# Patient Record
Sex: FEMALE | Race: WHITE | Employment: FULL TIME | URBAN - METROPOLITAN AREA
[De-identification: names, ages, dates, MRNs, and addresses within clinical notes are randomized per-mention and may not be internally consistent; named-entity substitution may affect disease eponyms.]

---

## 2017-01-25 ENCOUNTER — OFFICE VISIT (OUTPATIENT)
Dept: SURGERY | Age: 42
End: 2017-01-25

## 2017-01-25 VITALS
DIASTOLIC BLOOD PRESSURE: 86 MMHG | HEIGHT: 66 IN | HEART RATE: 70 BPM | WEIGHT: 245 LBS | OXYGEN SATURATION: 100 % | RESPIRATION RATE: 16 BRPM | SYSTOLIC BLOOD PRESSURE: 134 MMHG | BODY MASS INDEX: 39.37 KG/M2

## 2017-01-25 DIAGNOSIS — K90.9 INTESTINAL MALABSORPTION, UNSPECIFIED TYPE: Primary | ICD-10-CM

## 2017-01-25 DIAGNOSIS — Z98.84 S/P BARIATRIC SURGERY: ICD-10-CM

## 2017-01-25 RX ORDER — MULTIVIT WITH MINERALS/HERBS
1 TABLET ORAL DAILY
COMMUNITY
End: 2022-09-15

## 2017-01-25 RX ORDER — MAGNESIUM 200 MG
1000 TABLET ORAL DAILY
COMMUNITY

## 2017-01-25 NOTE — PATIENT INSTRUCTIONS
Body Mass Index: Care Instructions  Your Care Instructions    Body mass index (BMI) can help you see if your weight is raising your risk for health problems. It uses a formula to compare how much you weigh with how tall you are. A BMI between 18.5 and 24.9 is considered healthy. A BMI between 25 and 29.9 is considered overweight. A BMI of 30 or higher is considered obese. If your BMI is in the normal range, it means that you have a lower risk for weight-related health problems. If your BMI is in the overweight or obese range, you may be at increased risk for weight-related health problems, such as high blood pressure, heart disease, stroke, arthritis or joint pain, and diabetes. BMI is just one measure of your risk for weight-related health problems. You may be at higher risk for health problems if you are not active, you eat an unhealthy diet, or you drink too much alcohol or use tobacco products. Follow-up care is a key part of your treatment and safety. Be sure to make and go to all appointments, and call your doctor if you are having problems. It's also a good idea to know your test results and keep a list of the medicines you take. How can you care for yourself at home? · Practice healthy eating habits. This includes eating plenty of fruits, vegetables, whole grains, lean protein, and low-fat dairy. · Get at least 30 minutes of exercise 5 days a week or more. Brisk walking is a good choice. You also may want to do other activities, such as running, swimming, cycling, or playing tennis or team sports. · Do not smoke. Smoking can increase your risk for health problems. If you need help quitting, talk to your doctor about stop-smoking programs and medicines. These can increase your chances of quitting for good. · Limit alcohol to 2 drinks a day for men and 1 drink a day for women. Too much alcohol can cause health problems.   If you have a BMI higher than 25  · Your doctor may do other tests to check your risk for weight-related health problems. This may include measuring the distance around your waist. A waist measurement of more than 40 inches in men or 35 inches in women can increase the risk of weight-related health problems. · Talk with your doctor about steps you can take to stay healthy or improve your health. You may need to make lifestyle changes to lose weight and stay healthy, such as changing your diet and getting regular exercise. Where can you learn more? Go to http://jenna-mary.info/. Enter S176 in the search box to learn more about \"Body Mass Index: Care Instructions. \"  Current as of: February 16, 2016  Content Version: 11.1  © 0127-7849 [employer]. Care instructions adapted under license by [employer] (which disclaims liability or warranty for this information). If you have questions about a medical condition or this instruction, always ask your healthcare professional. [place] [address] any warranty or liability for your use of this information. Patient Instructions      1. Continue to monitor carbohydrate and protein intake- remember to keep your           total  carbohydrates to 50 grams or less per day for best results. 2. Remember hydration goals - usually 48 to 64 ounces of liquids per day  3. Continue to work towards exercise goals - minimum 3 days per week of 45          minutes to  1 hour at a time. 4. Remember to take vitamins as directed        Supplement Resource Guide    Importance of Protein:   Maintains lean body mass, produces antibodies to fight off infections, heals wounds, minimizes hair loss, helps to give you energy, helps with satiety, and keeping you full between meals.     Importance of Calcium:  Needed for healthy bones and teeth, normal blood clotting, and nervous system functioning, higher risk of osteoporosis and bone disease with non-compliance. Importance of Multivitamins: Many functions. Supply you with extra nutrients that you may be missing from food. May lead to iron deficiency anemia, weakness, fatigue, and many other symptoms with non-compliance. Importance of B Vitamins:  Important for red blood cell formation, metabolism, energy, and helps to maintain a healthy nervous system. Protein Supplement  Find one you like now. Use immediately after surgery. Look for:  35-50g protein each day from your protein supplement once you reach the progression diet. 0-3 g fat per serving  0-3 g sugar per serving    Protein drinks should be split in separate dosages. Recommend: Lifelong  1 year + Calcium Supplement:     Start taking within a month after surgery. Look for: Calcium Citrate Plus D (1500 mg per day)  Recommend: Citracal     .          Avoid chocolate chewable calcium. Can use chewable bariatric or [employer] brand or similar chewable. The body cannot absorb more than 500-600 mg @ a time. Take for Life Multi-vitamin Supplement:      1st Month After Surgery: Any complete chewable, such as: [place]s Complete chewables. Avoid [place] sours or gummies. They lack iron and other important nutrients and also have added sugar. Continue with chewable vitamin or change to adult complete multivitamin one month after surgery. Menstruating women can take a prenatal vitamin. Make sure has at least 18 mg iron and 613-283 mcg folic acid):    Vitamin B12, B Complex Vitamin, and Biotin  Start taking within a month after surgery. Vitamin B12:  1000 mcg of Vitamin B12 three times weekly    Must take sublingually (meaning you take it under your tongue) or in a liquid drop form for easy absorption. B Complex Vitamin: Take a pill or liquid drop form once daily. Biotin: This vitamin can help prevent hair loss.     Recommend 5mg   (5000 mcg) a day  Biotin is Optional

## 2017-01-25 NOTE — MR AVS SNAPSHOT
Visit Information Date & Time Provider Department Dept. Phone Encounter #  
 1/25/2017  9:30 AM Zehra Larkin MD [facility] [phone] [id number] Follow-up Instructions Return in about 2 months (around 3/25/2017). Follow-up and Disposition History Your Appointments 1/25/2017 10:00 AM  
NUTRITION COUNSELING with TSS NUTR VISIT2 [facility] ([address]) Appt Note: 2 mos [address] [facility] [address]  
[phone]  
  
   
 [address] [address]  
  
    
 3/28/2017  9:00 AM  
Office Visit with Charles Byrd NP [facility] ([address])  
 [address] [facility] [address]  
  
   
 [address] [place] [address] Upcoming Health Maintenance Date Due DTaP/Tdap/Td series (1 - Tdap) 8/2/1994 PAP AKA CERVICAL CYTOLOGY 4/8/2016 INFLUENZA AGE 9 TO ADULT 8/1/2016 Allergies as of 1/25/2017  Review Complete On: 1/25/2017 By: Zehra Larkin MD  
  
 Severity Noted Reaction Type Reactions Codeine    Nausea and Vomiting, Other (comments)  
 dizziness Current Immunizations  Never Reviewed Name Date Influenza Vaccine PF 10/24/2013  3:17 PM  
  
 Not reviewed this visit You Were Diagnosed With   
  
 Codes Comments Intestinal malabsorption, unspecified type    -  Primary ICD-10-CM: K90.9 ICD-9-CM: 579.9 S/P bariatric surgery     ICD-10-CM: Z98.84 ICD-9-CM: V45.86 Vitals BP Pulse Resp Height(growth percentile) Weight(growth percentile) SpO2  
 134/86 (BP 1 Location: Left arm, BP Patient Position: Sitting) 70 16 5' 6.25\" (1.683 m) 245 lb (111.1 kg) 100% BMI OB Status Smoking Status 39.25 kg/m2 Having regular periods Never Smoker Vitals History BMI and BSA Data Body Mass Index Body Surface Area  
 39.25 kg/m 2 2.28 m 2 Preferred Pharmacy Pharmacy Name Phone [facility] [address] [place] [address] [phone] Your Updated Medication List  
  
   
This list is accurate as of: 1/25/17  9:54 AM.  Always use your most recent med list.  
  
  
  
  
 b complex vitamins tablet Take 1 Tab by mouth daily. levothyroxine 150 mcg tablet Commonly known as:  SYNTHROID Take 150 mcg by mouth Daily (before breakfast). multivitamin with iron chewable tablet Commonly known as:  Judy Harrier Take 2 Tabs by mouth daily. OMEPRAZOLE PO Take  by mouth. PROBIOTIC 4X 10-15 mg Tbec Generic drug:  B.infantis-B.ani-B.long-B.bifi Take 1 Tab by mouth daily. sertraline 50 mg tablet Commonly known as:  ZOLOFT Take 50 mg by mouth daily. ursodiol 500 mg tablet Commonly known as:  EMILY FORTE Take 1 Tab by mouth daily. Take with food. Indications: CHOLELITHIASIS PREVENTION  
  
 VITAMIN B-12 1,000 mcg sublingual tablet Generic drug:  cyanocobalamin Take 1,000 mcg by mouth daily. Follow-up Instructions Return in about 2 months (around 3/25/2017). Patient Instructions Body Mass Index: Care Instructions Your Care Instructions Body mass index (BMI) can help you see if your weight is raising your risk for health problems. It uses a formula to compare how much you weigh with how tall you are. A BMI between 18.5 and 24.9 is considered healthy. A BMI between 25 and 29.9 is considered overweight. A BMI of 30 or higher is considered obese. If your BMI is in the normal range, it means that you have a lower risk for weight-related health problems. If your BMI is in the overweight or obese range, you may be at increased risk for weight-related health problems, such as high blood pressure, heart disease, stroke, arthritis or joint pain, and diabetes. BMI is just one measure of your risk for weight-related health problems. You may be at higher risk for health problems if you are not active, you eat an unhealthy diet, or you drink too much alcohol or use tobacco products. Follow-up care is a key part of your treatment and safety. Be sure to make and go to all appointments, and call your doctor if you are having problems. It's also a good idea to know your test results and keep a list of the medicines you take. How can you care for yourself at home? · Practice healthy eating habits. This includes eating plenty of fruits, vegetables, whole grains, lean protein, and low-fat dairy. · Get at least 30 minutes of exercise 5 days a week or more. Brisk walking is a good choice. You also may want to do other activities, such as running, swimming, cycling, or playing tennis or team sports. · Do not smoke. Smoking can increase your risk for health problems. If you need help quitting, talk to your doctor about stop-smoking programs and medicines. These can increase your chances of quitting for good. · Limit alcohol to 2 drinks a day for men and 1 drink a day for women. Too much alcohol can cause health problems. If you have a BMI higher than 25 · Your doctor may do other tests to check your risk for weight-related health problems. This may include measuring the distance around your waist. A waist measurement of more than 40 inches in men or 35 inches in women can increase the risk of weight-related health problems. · Talk with your doctor about steps you can take to stay healthy or improve your health. You may need to make lifestyle changes to lose weight and stay healthy, such as changing your diet and getting regular exercise. Where can you learn more? Go to http://jenna-mary.info/. Enter S176 in the search box to learn more about \"Body Mass Index: Care Instructions. \" Current as of: February 16, 2016 Content Version: 11.1 © 6255-6937 [employer], [employer]. Care instructions adapted under license by [employer] (which disclaims liability or warranty for this information). If you have questions about a medical condition or this instruction, always ask your healthcare professional. Deisi De Los Santos any warranty or liability for your use of this information. Patient Instructions 1. Continue to monitor carbohydrate and protein intake- remember to keep your           total  carbohydrates to 50 grams or less per day for best results. 2. Remember hydration goals - usually 48 to 64 ounces of liquids per day 3. Continue to work towards exercise goals - minimum 3 days per week of 45          minutes to  1 hour at a time. 4. Remember to take vitamins as directed Supplement Resource Guide Importance of Protein:  
Maintains lean body mass, produces antibodies to fight off infections, heals wounds, minimizes hair loss, helps to give you energy, helps with satiety, and keeping you full between meals. Importance of Calcium: 
Needed for healthy bones and teeth, normal blood clotting, and nervous system functioning, higher risk of osteoporosis and bone disease with non-compliance. Importance of Multivitamins: Many functions. Supply you with extra nutrients that you may be missing from food. May lead to iron deficiency anemia, weakness, fatigue, and many other symptoms with non-compliance. Importance of B Vitamins: 
Important for red blood cell formation, metabolism, energy, and helps to maintain a healthy nervous system. Protein Supplement Find one you like now. Use immediately after surgery. Look for: 
35-50g protein each day from your protein supplement once you reach the progression diet. 0-3 g fat per serving 0-3 g sugar per serving Protein drinks should be split in separate dosages. Recommend: Lifelong 1 year + Calcium Supplement:  
 
Start taking within a month after surgery. Look for: Calcium Citrate Plus D (1500 mg per day) Recommend: Citracal 
 
 . Avoid chocolate chewable calcium. Can use chewable bariatric or [employer]NC brand or similar chewable. The body cannot absorb more than 500-600 mg @ a time. Take for Life Multi-vitamin Supplement:   
 
1st Month After Surgery: Any complete chewable, such as: [place]s Complete chewables. Avoid [place] sours or gummies. They lack iron and other important nutrients and also have added sugar. Continue with chewable vitamin or change to adult complete multivitamin one month after surgery. Menstruating women can take a prenatal vitamin. Make sure has at least 18 mg iron and 721-181 mcg folic acid): Vitamin B12, B Complex Vitamin, and Biotin Start taking within a month after surgery. Vitamin B12:  1000 mcg of Vitamin B12 three times weekly Must take sublingually (meaning you take it under your tongue) or in a liquid drop form for easy absorption. B Complex Vitamin: Take a pill or liquid drop form once daily. Biotin: This vitamin can help prevent hair loss. Recommend 5mg  
(5000 mcg) a day Biotin is Optional  
 
 
 
 
 Patient Instructions History Introducing [facility]! Tita Jiang introduces [employer] patient portal. Now you can access parts of your medical record, email your doctor's office, and request medication refills online. 1. In your internet browser, go to [email or web address] 2. Click on the First Time User? Click Here link in the Sign In box. You will see the New Member Sign Up page. 3. Enter your [employer] Access Code exactly as it appears below. You will not need to use this code after youve completed the sign-up process. If you do not sign up before the expiration date, you must request a new code. · [employer] Access Code: [email or web address] Expires: 1/30/2017  7:43 AM 
 
 4. Enter the last four digits of your Social Security Number (xxxx) and Date of Birth (mm/dd/yyyy) as indicated and click Submit. You will be taken to the next sign-up page. 5. Create a Digital Ocean ID. This will be your [employer] Ocean login ID and cannot be changed, so think of one that is secure and easy to remember. 6. Create a [employer] password. You can change your password at any time. 7. Enter your Password Reset Question and Answer. This can be used at a later time if you forget your password. 8. Enter your e-mail address. You will receive e-mail notification when new information is available in [address] 9. Click Sign Up. You can now view and download portions of your medical record. 10. Click the Download Summary menu link to download a portable copy of your medical information. If you have questions, please visit the Frequently Asked Questions section of the [employer] website. Remember, [employer] is NOT to be used for urgent needs. For medical emergencies, dial 911. Now available from your iPhone and Android! Please provide this summary of care documentation to your next provider. Your primary care clinician is listed as Sandra Cook. If you have any questions after today's visit, please call [phone].

## 2017-01-25 NOTE — PROGRESS NOTES
Subjective:     Kali Yeh  is a 35 y.o. female who presents for follow-up about 2.33 months following laparoscopic gastric bypass surgery. She has lost a total of 44 pounds since surgery. Body mass index is 39.25 kg/(m^2). .    Surgery related complication: none       She reports no issues and denies vomiting and abdominal pain. Patients pain score:0      The patient's exercise level: moderately active. Changes in her medical history and medications have been reviewed. Patient Active Problem List   Diagnosis Code    Hypothyroidism E03.9    PCOS (polycystic ovarian syndrome) E28.2    Gestational diabetes mellitus in childbirth, diet controlled O24.420    Morbid obesity with BMI of 40.0-44.9, adult ([facility] E66.01, Z68.41    Dysphagia R13.10    Anxiety F41.9    GERD (gastroesophageal reflux disease) K21.9    Dilation of esophagus K22.8    Morbid obesity ([address] E66.01    Arthritis M19.90    Morbid obesity with BMI of 45.0-49.9, adult ([facility] [address] E66.01, Z68.42    S/P bariatric surgery Z98.84    Intestinal malabsorption K90.9     Past Medical History   Diagnosis Date    Anxiety     Arthritis     gestational diabetes     Hypothyroidism     Morbid obesity ([address]     Morbid obesity with BMI of 40.0-44.9, adult ([facility]     Nausea & vomiting     PCOS (polycystic ovarian syndrome)      no medication as of 10/16    Psychiatric disorder      depression    Seasonal allergic rhinitis      Past Surgical History   Procedure Laterality Date    Hx gyn       dx laparoscopy    Hx gi  march 2010     lap band    Hx gi  07/2016     removal of gastric band    Hx knee arthroscopy Left      x 3     Current Outpatient Prescriptions   Medication Sig Dispense Refill    cyanocobalamin (VITAMIN B-12) 1,000 mcg sublingual tablet Take 1,000 mcg by mouth daily.  b complex vitamins tablet Take 1 Tab by mouth daily.  ursodiol (EMILY FORTE) 500 mg tablet Take 1 Tab by mouth daily. Take with food. Indications: CHOLELITHIASIS PREVENTION 30 Tab 4    OMEPRAZOLE PO Take  by mouth.  levothyroxine (SYNTHROID) 150 mcg tablet Take 150 mcg by mouth Daily (before breakfast).  sertraline (ZOLOFT) 50 mg tablet Take 50 mg by mouth daily.  multivitamin with iron (FLINTSTONES) chewable tablet Take 2 Tabs by mouth daily.  B.infantis-B.ani-B.long-B.bifi (PROBIOTIC 4X) 10-15 mg TbEC Take 1 Tab by mouth daily. Objective:     Visit Vitals    /86 (BP 1 Location: Left arm, BP Patient Position: Sitting)    Pulse 70    Resp 16    Ht 5' 6.25\" (1.683 m)    Wt 111.1 kg (245 lb)    SpO2 100%    BMI 39.25 kg/m2        Physical Exam:    General:  alert, cooperative, no distress, appears stated age   Lungs:   clear to auscultation bilaterally   Heart:  Regular rate and rhythm   Abdomen:   abdomen is soft without significant tenderness, masses, organomegaly or guarding; Incisions: healing well, no hernias         Assessment:     1. History of Morbid obesity, status post  laparoscopic gastric bypass surgery. Doing well; no concerns. .     Plan:     1. Remember to measure portions, continue low carbohydrate diet  2. Tolerated transition to solids without issue  3. Remember vitamin supplements. 4. Exercise regimen appears adequate. 5. Attend support group  6. Follow-up in 2 month(s). 7. Total time spent with the patient 20 minutes.

## 2017-03-28 ENCOUNTER — OFFICE VISIT (OUTPATIENT)
Dept: SURGERY | Age: 42
End: 2017-03-28

## 2017-03-28 VITALS
SYSTOLIC BLOOD PRESSURE: 124 MMHG | OXYGEN SATURATION: 99 % | HEART RATE: 87 BPM | DIASTOLIC BLOOD PRESSURE: 77 MMHG | BODY MASS INDEX: 36.16 KG/M2 | HEIGHT: 66 IN | WEIGHT: 225 LBS

## 2017-03-28 DIAGNOSIS — E55.9 HYPOVITAMINOSIS D: ICD-10-CM

## 2017-03-28 DIAGNOSIS — Z98.84 S/P BARIATRIC SURGERY: ICD-10-CM

## 2017-03-28 DIAGNOSIS — K90.9 INTESTINAL MALABSORPTION, UNSPECIFIED TYPE: Primary | ICD-10-CM

## 2017-03-28 RX ORDER — LANOLIN ALCOHOL/MO/W.PET/CERES
1 CREAM (GRAM) TOPICAL
COMMUNITY
End: 2019-06-28 | Stop reason: ALTCHOICE

## 2017-03-28 RX ORDER — GLUCOSAMINE/CHONDR SU A SOD 750-600 MG
TABLET ORAL
COMMUNITY
End: 2018-12-26 | Stop reason: ALTCHOICE

## 2017-03-28 NOTE — PATIENT INSTRUCTIONS
Continue to monitor carbohydrate and protein intake. Eat regularly. Remember to stay hydrated  Continue to take vitamins  Continue to work towards exercise goals  Follow-up with PCP - especially to check thyroid. Restart fransico forte. Take until 6 months post-op. Return in 2 months. Get labs. Call office with any future questions or concerns             Walking for Exercise: Care Instructions  Your Care Instructions  Walking is one of the easiest ways to get the exercise you need for good health. A brisk, 30-minute walk each day can help you feel better and have more energy. It can help you lower your risk of disease. Walking can help you keep your bones strong and your heart healthy. Check with your doctor before you start a walking plan if you have heart problems, other health issues, or you have not been active in a long time. Follow your doctor's instructions for safe levels of exercise. Follow-up care is a key part of your treatment and safety. Be sure to make and go to all appointments, and call your doctor if you are having problems. It's also a good idea to know your test results and keep a list of the medicines you take. How can you care for yourself at home? Getting started  · Start slowly and set a short-term goal. For example, walk for 5 or 10 minutes every day. · Bit by bit, increase the amount you walk every day. Try for at least 30 minutes on most days of the week. You also may want to swim, bike, or do other activities. · If finding enough time is a problem, it is fine to be active in blocks of 10 minutes or more throughout your day and week. · To get the heart-healthy benefits of walking, you need to walk briskly enough to increase your heart rate and breathing, but not so fast that you cannot talk comfortably. · Wear comfortable shoes that fit well and provide good support for your feet and ankles.   Staying with your plan  · After you've made walking a habit, set a longer-term goal. You may want to set a goal of walking briskly for longer or walking farther. Experts say to do 2½ hours of moderate activity a week. A faster heartbeat is what defines moderate-level activity. · To stay motivated, walk with friends, coworkers, or pets. · Use a phone celina or pedometer to track your steps each day. Set a goal to increase your steps. Once you get there, set a higher goal. Adults should work toward 10,000 steps a day. Children who are 10to 15years old need more steps--at least 12,000 for girls and at least 15,000 for boys. · If the weather keeps you from walking outside, go for walks at the mall with a friend. Local schools and churches may have indoor gyms where you can walk. Fitting a walk into your workday  · Park several blocks away from work, or get off the bus a few stops early. · Use the stairs instead of the elevator, at least for a few floors. · Suggest holding meetings with colleagues during a walk inside or outside the building. · Use the restroom that is the farthest from your desk or workstation. · Use your morning and afternoon breaks to take quick 15-minute walks. Staying safe  · Know your surroundings. Walk in a well-lighted, safe place. If it is dark, walk with a partner. Wear light-colored clothing. If you can, buy a vest or jacket that reflects light. · Carry a cell phone for emergencies. · Drink plenty of water. Take a water bottle with you when you walk. This is very important if it is hot out. · Be careful not to slip on wet or icy ground. You can buy \"grippers\" for your shoes to help keep you from slipping. · Pay attention to your walking surface. Use sidewalks and paths. · If you have breathing problems like asthma or COPD, ask your doctor when it is safe for you to walk outdoors. Cold, dry air, smog, pollen, or other things in the air could cause breathing problems. Where can you learn more? Go to http://jenna-mary.info/.   Enter R159 in the search box to learn more about \"Walking for Exercise: Care Instructions. \"  Current as of: May 27, 2016  Content Version: 11.1  © 0403-3689 [employer]. Care instructions adapted under license by [employer] (which disclaims liability or warranty for this information). If you have questions about a medical condition or this instruction, always ask your healthcare professional. [address] any warranty or liability for your use of this information.

## 2017-03-29 NOTE — PROGRESS NOTES
Subjective:   Elliot Balderas  is a 35 y.o. female who presents for follow-up about 4 months following laparoscopic gastric bypass surgery. She is a former lap band pt of Dr. Riri Saeed who had her band removed 7/15/16. Surgery related complication: NA. She has lost a total of 64 pounds since surgery. Body mass index is 36.04 kg/(m^2). Loss of EBW is 42%. The patient presents today to assess their progress toward their weight loss goal & to address any issues that may be present:  She is tolerating solids without difficulty and denies vomiting and abdominal pain. Asymptomatic off omeprazole. Fluid intake:  good                                                Protein intake: good - food + protein shakes  Meals/day: 4+  Taking vitamins as recommended. The patient's exercise level: very active. Walking, swimming. Changes in her medical history and medications have been reviewed. Took fransico forte for 1 month, but did not realize that she had to continue taking it.     Patient Active Problem List   Diagnosis Code    Hypothyroidism E03.9    PCOS (polycystic ovarian syndrome) E28.2    Gestational diabetes mellitus in childbirth, diet controlled O24.420    Morbid obesity with BMI of 40.0-44.9, adult ([facility] [address] E66.01, Z68.41    Dysphagia R13.10    Anxiety F41.9    GERD (gastroesophageal reflux disease) K21.9    Dilation of esophagus K22.8    Morbid obesity ([address] E66.01    Arthritis M19.90    Morbid obesity with BMI of 45.0-49.9, adult ([facility] [address] E66.01, Z68.42    S/P bariatric surgery Z98.84    Intestinal malabsorption K90.9     Past Medical History:   Diagnosis Date    Anxiety     Arthritis     gestational diabetes     Hypothyroidism     Morbid obesity ([facility] [address]     Morbid obesity with BMI of 40.0-44.9, adult ([facility])     Nausea & vomiting     PCOS (polycystic ovarian syndrome)     no medication as of 10/16    Psychiatric disorder     depression    Seasonal allergic rhinitis      Past Surgical History:   Procedure Laterality Date    HX GI  march 2010    lap band    HX GI  07/2016    removal of gastric band    HX GYN      dx laparoscopy    HX KNEE ARTHROSCOPY Left     x 3     Current Outpatient Prescriptions   Medication Sig Dispense Refill    Biotin 2,500 mcg cap Take  by mouth.  calcium citrate-vitamin d3 (CITRACAL+D) 315-200 mg-unit tab Take 1 Tab by mouth daily (with breakfast).  cyanocobalamin (VITAMIN B-12) 1,000 mcg sublingual tablet Take 1,000 mcg by mouth daily.  b complex vitamins tablet Take 1 Tab by mouth daily.  levothyroxine (SYNTHROID) 150 mcg tablet Take 150 mcg by mouth Daily (before breakfast).  sertraline (ZOLOFT) 50 mg tablet Take 50 mg by mouth daily.  multivitamin with iron (FLINTSTONES) chewable tablet Take 2 Tabs by mouth daily.  ursodiol (EMILY FORTE) 500 mg tablet Take 1 Tab by mouth daily. Take with food. Indications: CHOLELITHIASIS PREVENTION 30 Tab 4    B.infantis-B.ani-B.long-B.bifi (PROBIOTIC 4X) 10-15 mg TbEC Take 1 Tab by mouth daily. Review of Systems:  General - Denies fatigue, fever or chills  Cardiac - Denies chest pain, palpitations or shortness of breath  Pulmonary - Denies shortness of breath or productive cough  Gastrointestinal - as noted above  Musculoskeletal - Denies joint or muscular weakness, pain, stiffness  Hematologic - Denies abnormal bleeding or bruising  Neurologic - Denies weakness, paralysis, numbness, tingling    Objective:     Visit Vitals    /77 (BP 1 Location: Left arm, BP Patient Position: Sitting)    Pulse 87    Ht 5' 6.25\" (1.683 m)    Wt 102.1 kg (225 lb)    SpO2 99%    BMI 36.04 kg/m2        Physical Exam:    General:  alert, cooperative, no distress, appears stated age   Lungs:   clear to auscultation bilaterally   Heart:  Regular rate and rhythm   Abdomen:   abdomen is soft without tenderness, masses, organomegaly or guarding; Active bowel sounds all 4 quadrants.    Incisions: healed         Assessment:     1. History of Morbid obesity, status post  laparoscopic gastric bypass surgery. Doing well; no concerns. 2.  GERD -asymptomatic off omeprazole     Plan:       1. Today the patient & I have reviewed their weight loss and their diet - including how appropriate their weight loss and food choices are.   2. To continue focus on hydration. 3. Reminded to measure portions, continue high protein, low carbohydrate diet. Reminded to eat regularly, to eat slowly & not to drink with meals. Pt verbalized understanding of diet. Reminded of availability of dietician support. 4.  Reminded of importance of vitamin supplements. 5. To continue to increase activity. 6. Encouraged to attend support group. 7. To continue current rx. To restart fransico forte & take until 6m post-op. To continue follow-up with PCP. 8. Reminded of labs needed @ 6m visit. Lab slip given to pt. 9. I have discussed this plan and education material with patient. Pt verbalized understanding. 10. Follow-up in 2 month(s). To call if questions/concerns prior to office visit. 11. Total time spent with pt - 25 minutes    Ms. Phuong Byrd has a reminder for a \"due or due soon\" health maintenance. I have asked that she contact her primary care provider for follow-up on this health maintenance.

## 2017-05-31 ENCOUNTER — HOSPITAL ENCOUNTER (OUTPATIENT)
Dept: LAB | Age: 42
Discharge: HOME | End: 2017-05-31
Payer: COMMERCIAL

## 2017-05-31 ENCOUNTER — OFFICE VISIT (OUTPATIENT)
Dept: SURGERY | Age: 42
End: 2017-05-31

## 2017-05-31 VITALS
HEART RATE: 71 BPM | DIASTOLIC BLOOD PRESSURE: 69 MMHG | OXYGEN SATURATION: 100 % | RESPIRATION RATE: 16 BRPM | HEIGHT: 66 IN | SYSTOLIC BLOOD PRESSURE: 96 MMHG | WEIGHT: 212 LBS | BODY MASS INDEX: 34.07 KG/M2

## 2017-05-31 DIAGNOSIS — E03.9 ACQUIRED HYPOTHYROIDISM: Primary | ICD-10-CM

## 2017-05-31 LAB
25(OH)D3 SERPL-MCNC: 28.3 NG/ML (ref 30–100)
ALBUMIN SERPL BCP-MCNC: 3.7 G/DL (ref 3.4–5)
ALBUMIN/GLOB SERPL: 0.9 {RATIO} (ref 0.8–1.7)
ALP SERPL-CCNC: 74 U/L (ref 45–117)
ALT SERPL-CCNC: 18 U/L (ref 13–56)
ANION GAP BLD CALC-SCNC: 7 MMOL/L (ref 3–18)
AST SERPL W P-5'-P-CCNC: 16 U/L (ref 15–37)
BASOPHILS # BLD AUTO: 0 K/UL (ref 0–0.06)
BASOPHILS # BLD: 0 % (ref 0–2)
BILIRUB SERPL-MCNC: 0.4 MG/DL (ref 0.2–1)
BUN SERPL-MCNC: 16 MG/DL (ref 7–18)
BUN/CREAT SERPL: 19 (ref 12–20)
CALCIUM SERPL-MCNC: 9.1 MG/DL (ref 8.5–10.1)
CHLORIDE SERPL-SCNC: 105 MMOL/L (ref 100–108)
CO2 SERPL-SCNC: 29 MMOL/L (ref 21–32)
CREAT SERPL-MCNC: 0.86 MG/DL (ref 0.6–1.3)
DIFFERENTIAL METHOD BLD: ABNORMAL
EOSINOPHIL # BLD: 0.1 K/UL (ref 0–0.4)
EOSINOPHIL NFR BLD: 2 % (ref 0–5)
ERYTHROCYTE [DISTWIDTH] IN BLOOD BY AUTOMATED COUNT: 14.8 % (ref 11.6–14.5)
FERRITIN SERPL-MCNC: 13 NG/ML (ref 8–388)
FOLATE SERPL-MCNC: >20 NG/ML (ref 3.1–17.5)
GLOBULIN SER CALC-MCNC: 3.9 G/DL (ref 2–4)
GLUCOSE SERPL-MCNC: 88 MG/DL (ref 74–99)
HCT VFR BLD AUTO: 41.6 % (ref 35–45)
HGB BLD-MCNC: 13.7 G/DL (ref 12–16)
IRON SERPL-MCNC: 68 UG/DL (ref 50–175)
LYMPHOCYTES # BLD AUTO: 33 % (ref 21–52)
LYMPHOCYTES # BLD: 2.6 K/UL (ref 0.9–3.6)
MCH RBC QN AUTO: 26.9 PG (ref 24–34)
MCHC RBC AUTO-ENTMCNC: 32.9 G/DL (ref 31–37)
MCV RBC AUTO: 81.7 FL (ref 74–97)
MONOCYTES # BLD: 0.4 K/UL (ref 0.05–1.2)
MONOCYTES NFR BLD AUTO: 5 % (ref 3–10)
NEUTS SEG # BLD: 4.9 K/UL (ref 1.8–8)
NEUTS SEG NFR BLD AUTO: 60 % (ref 40–73)
PLATELET # BLD AUTO: 331 K/UL (ref 135–420)
PMV BLD AUTO: 11.2 FL (ref 9.2–11.8)
POTASSIUM SERPL-SCNC: 4.7 MMOL/L (ref 3.5–5.5)
PROT SERPL-MCNC: 7.6 G/DL (ref 6.4–8.2)
RBC # BLD AUTO: 5.09 M/UL (ref 4.2–5.3)
SODIUM SERPL-SCNC: 141 MMOL/L (ref 136–145)
TSH SERPL DL<=0.05 MIU/L-ACNC: 2.97 UIU/ML (ref 0.36–3.74)
VIT B12 SERPL-MCNC: 1384 PG/ML (ref 211–911)
WBC # BLD AUTO: 8 K/UL (ref 4.6–13.2)

## 2017-05-31 PROCEDURE — 83540 ASSAY OF IRON: CPT | Performed by: SPECIALIST

## 2017-05-31 PROCEDURE — 82728 ASSAY OF FERRITIN: CPT | Performed by: SPECIALIST

## 2017-05-31 PROCEDURE — 80053 COMPREHEN METABOLIC PANEL: CPT | Performed by: SPECIALIST

## 2017-05-31 PROCEDURE — 84425 ASSAY OF VITAMIN B-1: CPT | Performed by: SPECIALIST

## 2017-05-31 PROCEDURE — 82306 VITAMIN D 25 HYDROXY: CPT | Performed by: SPECIALIST

## 2017-05-31 PROCEDURE — 82607 VITAMIN B-12: CPT | Performed by: SPECIALIST

## 2017-05-31 PROCEDURE — 36415 COLL VENOUS BLD VENIPUNCTURE: CPT | Performed by: SPECIALIST

## 2017-05-31 PROCEDURE — 84443 ASSAY THYROID STIM HORMONE: CPT | Performed by: SPECIALIST

## 2017-05-31 PROCEDURE — 85025 COMPLETE CBC W/AUTO DIFF WBC: CPT | Performed by: SPECIALIST

## 2017-05-31 NOTE — PATIENT INSTRUCTIONS
Body Mass Index: Care Instructions  Your Care Instructions    Body mass index (BMI) can help you see if your weight is raising your risk for health problems. It uses a formula to compare how much you weigh with how tall you are. · A BMI lower than 18.5 is considered underweight. · A BMI between 18.5 and 24.9 is considered healthy. · A BMI between 25 and 29.9 is considered overweight. A BMI of 30 or higher is considered obese. If your BMI is in the normal range, it means that you have a lower risk for weight-related health problems. If your BMI is in the overweight or obese range, you may be at increased risk for weight-related health problems, such as high blood pressure, heart disease, stroke, arthritis or joint pain, and diabetes. If your BMI is in the underweight range, you may be at increased risk for health problems such as fatigue, lower protection (immunity) against illness, muscle loss, bone loss, hair loss, and hormone problems. BMI is just one measure of your risk for weight-related health problems. You may be at higher risk for health problems if you are not active, you eat an unhealthy diet, or you drink too much alcohol or use tobacco products. Follow-up care is a key part of your treatment and safety. Be sure to make and go to all appointments, and call your doctor if you are having problems. It's also a good idea to know your test results and keep a list of the medicines you take. How can you care for yourself at home? · Practice healthy eating habits. This includes eating plenty of fruits, vegetables, whole grains, lean protein, and low-fat dairy. · If your doctor recommends it, get more exercise. Walking is a good choice. Bit by bit, increase the amount you walk every day. Try for at least 30 minutes on most days of the week. · Do not smoke. Smoking can increase your risk for health problems. If you need help quitting, talk to your doctor about stop-smoking programs and medicines. These can increase your chances of quitting for good. · Limit alcohol to 2 drinks a day for men and 1 drink a day for women. Too much alcohol can cause health problems. If you have a BMI higher than 25  · Your doctor may do other tests to check your risk for weight-related health problems. This may include measuring the distance around your waist. A waist measurement of more than 40 inches in men or 35 inches in women can increase the risk of weight-related health problems. · Talk with your doctor about steps you can take to stay healthy or improve your health. You may need to make lifestyle changes to lose weight and stay healthy, such as changing your diet and getting regular exercise. If you have a BMI lower than 18.5  · Your doctor may do other tests to check your risk for health problems. · Talk with your doctor about steps you can take to stay healthy or improve your health. You may need to make lifestyle changes to gain or maintain weight and stay healthy, such as getting more healthy foods in your diet and doing exercises to build muscle. Where can you learn more? Go to http://jenna-mary.info/. Enter S176 in the search box to learn more about \"Body Mass Index: Care Instructions. \"  Current as of: January 23, 2017  Content Version: 11.2  © 5032-0318 [employer]. Care instructions adapted under license by [employer] (which disclaims liability or warranty for this information). If you have questions about a medical condition or this instruction, always ask your healthcare professional. [place] [address] any warranty or liability for your use of this information. Patient Instructions      1. Continue to monitor carbohydrate and protein intake- remember to keep your           total  carbohydrates to 50 grams or less per day for best results.   2. Remember hydration goals - usually 48 to 64 ounces of liquids per day  3. Continue to work towards exercise goals - minimum 3 days per week of 45          minutes to  1 hour at a time. 4. Remember to take vitamins as directed        Supplement Resource Guide    Importance of Protein:   Maintains lean body mass, produces antibodies to fight off infections, heals wounds, minimizes hair loss, helps to give you energy, helps with satiety, and keeping you full between meals. Importance of Calcium:  Needed for healthy bones and teeth, normal blood clotting, and nervous system functioning, higher risk of osteoporosis and bone disease with non-compliance. Importance of Multivitamins: Many functions. Supply you with extra nutrients that you may be missing from food. May lead to iron deficiency anemia, weakness, fatigue, and many other symptoms with non-compliance. Importance of B Vitamins:  Important for red blood cell formation, metabolism, energy, and helps to maintain a healthy nervous system. Protein Supplement  Find one you like now. Use immediately after surgery. Look for:  35-50g protein each day from your protein supplement once you reach the progression diet. 0-3 g fat per serving  0-3 g sugar per serving    Protein drinks should be split in separate dosages. Recommend: Lifelong  1 year + Calcium Supplement:     Start taking within a month after surgery. Look for: Calcium Citrate Plus D (1500 mg per day)  Recommend: Citracal     .          Avoid chocolate chewable calcium. Can use chewable bariatric or [employer] brand or similar chewable. The body cannot absorb more than 500-600 mg @ a time. Take for Life Multi-vitamin Supplement:      1st Month After Surgery: Any complete chewable, such as: Dyers Complete chewables. Avoid Dyer sours or gummies. They lack iron and other important nutrients and also have added sugar.     Continue with chewable vitamin or change to adult complete multivitamin one month after surgery. Menstruating women can take a prenatal vitamin. Make sure has at least 18 mg iron and 162-911 mcg folic acid):    Vitamin B12, B Complex Vitamin, and Biotin  Start taking within a month after surgery. Vitamin B12:  1000 mcg of Vitamin B12 three times weekly    Must take sublingually (meaning you take it under your tongue) or in a liquid drop form for easy absorption. B Complex Vitamin: Take a pill or liquid drop form once daily. Biotin: This vitamin can help prevent hair loss.     Recommend 5mg   (5000 mcg) a day  Biotin is Optional

## 2017-05-31 NOTE — PROGRESS NOTES
Subjective:     Erinn Anders  is a 35 y.o. female who presents for follow-up about 6 months following laparoscopic gastric bypass surgery. She has lost a total of 77 pounds since surgery. Body mass index is 33.96 kg/(m^2). . EBWL is (50%). The patient presents today to assess their progress toward their goal of weight loss and to address any issues that may be present. Today the patient and I have reviewed their diet and how appropriate their food choices are. The following issues have been identified : no new issues. .  Surgery related complication: none       She reports no issues and denies vomiting and abdominal pain. The patients diet choices have been reviewed today and are as follows:      Breakfast: protein shake      Lunch: chicken salad or leftovers      Snack: protein chips, shake      Supper :cooking    Patients pain score:0      The patient's exercise level: exercising 4 times week    Changes in her medical history and medications have been reviewed.     Patient Active Problem List   Diagnosis Code    Hypothyroidism E03.9    PCOS (polycystic ovarian syndrome) E28.2    Gestational diabetes mellitus in childbirth, diet controlled O24.420    Morbid obesity with BMI of 40.0-44.9, adult ([facility] [address] E66.01, Z68.41    Dysphagia R13.10    Anxiety F41.9    GERD (gastroesophageal reflux disease) K21.9    Dilation of esophagus K22.8    Morbid obesity ([address] E66.01    Arthritis M19.90    Morbid obesity with BMI of 45.0-49.9, adult ([address] E66.01, Z68.42    S/P bariatric surgery Z98.84    Intestinal malabsorption K90.9     Past Medical History:   Diagnosis Date    Anxiety     Arthritis     gestational diabetes     Hypothyroidism     Morbid obesity ([address]     Morbid obesity with BMI of 40.0-44.9, adult ([facility])     Nausea & vomiting     PCOS (polycystic ovarian syndrome)     no medication as of 10/16    Psychiatric disorder     depression    Seasonal allergic rhinitis      Past Surgical History:   Procedure Laterality Date    HX GI  march 2010    lap band    HX GI  07/2016    removal of gastric band    HX GYN      dx laparoscopy    HX KNEE ARTHROSCOPY Left     x 3     Current Outpatient Prescriptions   Medication Sig Dispense Refill    Biotin 2,500 mcg cap Take  by mouth.  calcium citrate-vitamin d3 (CITRACAL+D) 315-200 mg-unit tab Take 1 Tab by mouth daily (with breakfast).  cyanocobalamin (VITAMIN B-12) 1,000 mcg sublingual tablet Take 1,000 mcg by mouth daily.  b complex vitamins tablet Take 1 Tab by mouth daily.  levothyroxine (SYNTHROID) 150 mcg tablet Take 150 mcg by mouth Daily (before breakfast).  sertraline (ZOLOFT) 50 mg tablet Take 50 mg by mouth daily.  multivitamin with iron (FLINTSTONES) chewable tablet Take 2 Tabs by mouth daily.  ursodiol (EMILY FORTE) 500 mg tablet Take 1 Tab by mouth daily. Take with food. Indications: CHOLELITHIASIS PREVENTION 30 Tab 4    B.infantis-B.ani-B.long-B.bifi (PROBIOTIC 4X) 10-15 mg TbEC Take 1 Tab by mouth daily.           Review of Symptoms:       General - No history or complaints of unexpected fever or chills  Head/Neck - No history or complaints of headache or dizziness  Cardiac - No history or complaints of chest pain, palpitations, or shortness of breath  Pulmonary - No history or complaints of shortness of breath or productive cough  Gastrointestinal - as noted above  Genitourinary - No history or complaints of hematuria/dysuria or renal lithiasis  Musculoskeletal - No history or complaints of joint  muscular weakness  Hematologic - No history of any bleeding episodes  Neurologic - No history or complaints of  migraine headaches or neurologic symptoms                     Objective:     Visit Vitals    BP 96/69 (BP 1 Location: Left arm, BP Patient Position: Sitting)    Pulse 71    Resp 16    Ht 5' 6.25\" (1.683 m)    Wt 96.2 kg (212 lb)    SpO2 100%    BMI 33.96 kg/m2        Physical Exam:    General: alert, cooperative, no distress, appears stated age   Lungs:   clear to auscultation bilaterally   Heart:  Regular rate and rhythm   Abdomen:   abdomen is soft without significant tenderness, masses, organomegaly or guarding; Incisions: healing well, no hernias         Lab Results   Component Value Date/Time    WBC 9.2 11/09/2016 10:45 AM    HGB 14.1 11/09/2016 10:45 AM    HCT 43.8 11/09/2016 10:45 AM    PLATELET 919 41/02/3202 10:45 AM    MCV 84.6 11/09/2016 10:45 AM    Hgb, External 14.1 03/06/2013    Hct, External 36.5 03/06/2013    Platelet cnt., External 260K 03/06/2013     Lab Results   Component Value Date/Time    Sodium 144 11/09/2016 10:45 AM    Potassium 4.8 11/09/2016 10:45 AM    Chloride 105 11/09/2016 10:45 AM    CO2 33 11/09/2016 10:45 AM    Anion gap 6 11/09/2016 10:45 AM    Glucose 79 11/09/2016 10:45 AM    BUN 11 11/09/2016 10:45 AM    Creatinine 0.91 11/09/2016 10:45 AM    BUN/Creatinine ratio 12 11/09/2016 10:45 AM    GFR est AA >60 11/09/2016 10:45 AM    GFR est non-AA >60 11/09/2016 10:45 AM    Calcium 8.6 11/09/2016 10:45 AM    Bilirubin, total 0.3 11/09/2016 10:45 AM    AST (SGOT) 19 11/09/2016 10:45 AM    Alk. phosphatase 71 11/09/2016 10:45 AM    Protein, total 7.6 11/09/2016 10:45 AM    Albumin 3.7 11/09/2016 10:45 AM    Globulin 3.9 11/09/2016 10:45 AM    A-G Ratio 0.9 11/09/2016 10:45 AM    ALT (SGPT) 28 11/09/2016 10:45 AM     Lab Results   Component Value Date/Time    Iron 53 01/25/2010 09:39 AM     No results found for: FOL, RBCF  No results found for: VITD3, Allison Sicard, XQVID, VD3RIA          Assessment:     1. History of Morbid obesity, status post  laparoscopic gastric bypass surgery. Doing well; no concerns. .     Plan:     1. Remember to measure portions, continue low carbohydrate diet  2. Continue to concentrate on protein intake meeting daily requirements  3. Remember vitamin supplements.  The importance of such was discussed regarding the malabsorptive issues that the surgery creates. 4. Exercise regimen appears to be: fairly good  5. Try and attend support group if feasible. 6. Follow-up in 3 month(s). 7. Lab to be drawn today. 8. Total time spent with the patient 30 minutes.

## 2017-06-02 ENCOUNTER — TELEPHONE (OUTPATIENT)
Dept: SURGERY | Age: 42
End: 2017-06-02

## 2017-06-02 LAB — VIT B1 BLD-SCNC: 119.3 NMOL/L (ref 66.5–200)

## 2017-06-02 RX ORDER — ERGOCALCIFEROL 1.25 MG/1
50000 CAPSULE ORAL 2 TIMES WEEKLY
Qty: 52 CAP | Refills: 1 | Status: SHIPPED | OUTPATIENT
Start: 2017-06-02 | End: 2017-08-30 | Stop reason: SDUPTHER

## 2017-06-02 NOTE — TELEPHONE ENCOUNTER
----- Message from Gabriel Robles MD sent at 6/1/2017  2:05 PM EDT -----  Call in Vitamin D 50,000 units twice weekly for 1 year

## 2017-06-02 NOTE — TELEPHONE ENCOUNTER
I TRIED TO CALL PATIENT BUT HER MAIL BOX WAS FULL SO I COULD NOT LEAVE A MESSAGE. I WENT AHEAD AND SENT IN THE RX FOR VITAMIN D TO [facility] IN [place]. I WILL TRY TO CALL HER AGAIN.

## 2017-08-30 ENCOUNTER — OFFICE VISIT (OUTPATIENT)
Dept: SURGERY | Age: 42
End: 2017-08-30

## 2017-08-30 VITALS
HEART RATE: 82 BPM | DIASTOLIC BLOOD PRESSURE: 81 MMHG | BODY MASS INDEX: 32.95 KG/M2 | WEIGHT: 205 LBS | HEIGHT: 66 IN | OXYGEN SATURATION: 99 % | SYSTOLIC BLOOD PRESSURE: 119 MMHG | RESPIRATION RATE: 16 BRPM

## 2017-08-30 DIAGNOSIS — K90.9 INTESTINAL MALABSORPTION, UNSPECIFIED TYPE: Primary | ICD-10-CM

## 2017-08-30 RX ORDER — ERGOCALCIFEROL 1.25 MG/1
50000 CAPSULE ORAL 2 TIMES WEEKLY
Qty: 52 CAP | Refills: 1 | Status: SHIPPED | OUTPATIENT
Start: 2017-09-01 | End: 2018-02-28

## 2017-08-30 NOTE — PROGRESS NOTES
9.5 month follow-up    Subjective:     Mansoor Suarez  is a 38 y.o. female who presents for follow-up about 9.5 months following laparoscopic gastric bypass surgery. She has lost a total of 84 pounds since surgery. Body mass index is 32.84 kg/(m^2). . EBWL is (55%). The patient presents today to assess their progress toward their goal of weight loss and to address any issues that may be present. Today the patient and I have reviewed their diet and how appropriate their food choices are. The following issues have been identified - none from a surgical standpoint. Pain assessment - 0/10  . Surgery related complication: prior Ivar Minks banding pt       She reports no issues and denies vomiting and abdominal pain. The patient's exercise level: very active or exercises 3 times a week. Changes in her medical history and medications have been reviewed. Patient Active Problem List   Diagnosis Code    Hypothyroidism E03.9    PCOS (polycystic ovarian syndrome) E28.2    Anxiety F41.9    Dilation of esophagus K22.8    Arthritis M19.90    S/P bariatric surgery Z98.84    Intestinal malabsorption K90.9     Past Medical History:   Diagnosis Date    Anxiety     Arthritis     gestational diabetes     Hypothyroidism     Morbid obesity ([facility] [address]     Morbid obesity with BMI of 40.0-44.9, adult (HCC)     Nausea & vomiting     PCOS (polycystic ovarian syndrome)     no medication as of 10/16    Psychiatric disorder     depression    Seasonal allergic rhinitis      Past Surgical History:   Procedure Laterality Date    HX GI  march 2010    lap band    HX GI  07/2016    removal of gastric band    HX GYN      dx laparoscopy    HX KNEE ARTHROSCOPY Left     x 3    LAP GASTRIC BYPASS/KRYS-EN-Y      11/15/2016     Current Outpatient Prescriptions   Medication Sig Dispense Refill    Biotin 2,500 mcg cap Take  by mouth.       calcium citrate-vitamin d3 (CITRACAL+D) 315-200 mg-unit tab Take 1 Tab by mouth daily (with breakfast).  cyanocobalamin (VITAMIN B-12) 1,000 mcg sublingual tablet Take 1,000 mcg by mouth daily.  b complex vitamins tablet Take 1 Tab by mouth daily.  sertraline (ZOLOFT) 50 mg tablet Take 50 mg by mouth daily.  multivitamin with iron (FLINTSTONES) chewable tablet Take 2 Tabs by mouth daily.  ergocalciferol (ERGOCALCIFEROL) 50,000 unit capsule Take 1 Cap by mouth two (2) times a week for 180 days. Indications: VITAMIN D DEFICIENCY (HIGH DOSE THERAPY) 52 Cap 1    levothyroxine (SYNTHROID) 150 mcg tablet Take 150 mcg by mouth Daily (before breakfast). Review of Symptoms:     General - No history or complaints of unexpected fever or chills  Head/Neck - No history or complaints of headache or dizziness  Cardiac - No history or complaints of chest pain, palpitations, or shortness of breath  Pulmonary - No history or complaints of shortness of breath or productive cough  Gastrointestinal - as noted above  Genitourinary - No history or complaints of hematuria/dysuria or renal lithiasis  Musculoskeletal - No history or complaints of joint  muscular weakness  Hematologic - No history of any bleeding episodes  Neurologic - No history or complaints of  migraine headaches or neurologic symptoms    Objective:     Visit Vitals    /81 (BP 1 Location: Left arm, BP Patient Position: Sitting)    Pulse 82    Ht 5' 6.25\" (1.683 m)    Wt 93 kg (205 lb)    SpO2 99%    BMI 32.84 kg/m2        Physical Exam:    General:  alert, cooperative, no distress, appears stated age   Lungs:   clear to auscultation bilaterally   Heart:  Regular rate and rhythm   Abdomen:   abdomen is soft without significant tenderness, masses, organomegaly or guarding; Incisions: healing well, no hernias         Assessment:     1. History of Morbid obesity, status post  laparoscopic gastric bypass surgery. Doing well; no concerns. .     Plan:     1.  Remember to measure portions, continue low carbohydrate diet  2. Continue to watch portion size. 3. Remember vitamin supplements. 4. Exercise regimen appears adequate. 5. Attend support group  6. Follow-up in 3 month(s). 7. Total time spent with the patient 30 minutes. 8. Obtain lab prior to next visit which will be 1 year anniversary.   9. The patient understands the plan of action

## 2017-08-30 NOTE — PATIENT INSTRUCTIONS
Body Mass Index: Care Instructions  Your Care Instructions    Body mass index (BMI) can help you see if your weight is raising your risk for health problems. It uses a formula to compare how much you weigh with how tall you are. · A BMI lower than 18.5 is considered underweight. · A BMI between 18.5 and 24.9 is considered healthy. · A BMI between 25 and 29.9 is considered overweight. A BMI of 30 or higher is considered obese. If your BMI is in the normal range, it means that you have a lower risk for weight-related health problems. If your BMI is in the overweight or obese range, you may be at increased risk for weight-related health problems, such as high blood pressure, heart disease, stroke, arthritis or joint pain, and diabetes. If your BMI is in the underweight range, you may be at increased risk for health problems such as fatigue, lower protection (immunity) against illness, muscle loss, bone loss, hair loss, and hormone problems. BMI is just one measure of your risk for weight-related health problems. You may be at higher risk for health problems if you are not active, you eat an unhealthy diet, or you drink too much alcohol or use tobacco products. Follow-up care is a key part of your treatment and safety. Be sure to make and go to all appointments, and call your doctor if you are having problems. It's also a good idea to know your test results and keep a list of the medicines you take. How can you care for yourself at home? · Practice healthy eating habits. This includes eating plenty of fruits, vegetables, whole grains, lean protein, and low-fat dairy. · If your doctor recommends it, get more exercise. Walking is a good choice. Bit by bit, increase the amount you walk every day. Try for at least 30 minutes on most days of the week. · Do not smoke. Smoking can increase your risk for health problems. If you need help quitting, talk to your doctor about stop-smoking programs and medicines. These can increase your chances of quitting for good. · Limit alcohol to 2 drinks a day for men and 1 drink a day for women. Too much alcohol can cause health problems. If you have a BMI higher than 25  · Your doctor may do other tests to check your risk for weight-related health problems. This may include measuring the distance around your waist. A waist measurement of more than 40 inches in men or 35 inches in women can increase the risk of weight-related health problems. · Talk with your doctor about steps you can take to stay healthy or improve your health. You may need to make lifestyle changes to lose weight and stay healthy, such as changing your diet and getting regular exercise. If you have a BMI lower than 18.5  · Your doctor may do other tests to check your risk for health problems. · Talk with your doctor about steps you can take to stay healthy or improve your health. You may need to make lifestyle changes to gain or maintain weight and stay healthy, such as getting more healthy foods in your diet and doing exercises to build muscle. Where can you learn more? Go to http://jenna-mary.info/. Enter S176 in the search box to learn more about \"Body Mass Index: Care Instructions. \"  Current as of: January 23, 2017  Content Version: 11.3  © 7164-6075 [employer]. Care instructions adapted under license by [employer] (which disclaims liability or warranty for this information). If you have questions about a medical condition or this instruction, always ask your healthcare professional. [place] [address] any warranty or liability for your use of this information.

## 2017-12-08 ENCOUNTER — OFFICE VISIT (OUTPATIENT)
Dept: SURGERY | Age: 42
End: 2017-12-08

## 2017-12-08 VITALS
HEART RATE: 70 BPM | HEIGHT: 66 IN | OXYGEN SATURATION: 100 % | DIASTOLIC BLOOD PRESSURE: 80 MMHG | SYSTOLIC BLOOD PRESSURE: 134 MMHG | WEIGHT: 208 LBS | BODY MASS INDEX: 33.43 KG/M2

## 2017-12-08 DIAGNOSIS — K90.9 INTESTINAL MALABSORPTION, UNSPECIFIED TYPE: Primary | ICD-10-CM

## 2017-12-08 DIAGNOSIS — Z98.84 S/P BARIATRIC SURGERY: ICD-10-CM

## 2017-12-08 NOTE — PATIENT INSTRUCTIONS
Patient Instructions      1. Remember hydration goals - minimum of 64 ounces of liquids per day (dehydration is the number one reason for hospital readmission). 2. Continue to monitor carbohydrate and protein intake you need a minimum of  Grams of protein daily- remember to keep your total carbohydrates to 50 grams or less per day for best results. 3. Continue to work towards exercise goals - 60-90 minutes, 5 times a week minimum of deliberate, aerobic exercise is the ultimate goal with strength training 2 times each week. Refer to [employer] for  information. 4. Remember to take vitamins as directed in your handbook. 5. Attend support group the 2nd Thursday of each month. 6. Use Miralax if you become constipated. 7. Call us at [phone] or email us through [email or web address]" with questions,     concerns or worsening of condition, we have someone on call 24 hours a day. If you are unable to reach our office, you are to go to your Primary Care Physician or the Emergency Department. Supplement Resource Guide    Importance of Protein:   Maintains lean body mass, produces antibodies to fight off infections, heals wounds, minimizes hair loss, helps to give you energy, helps with satiety, and keeping you full between meals. Importance of Calcium:  Needed for healthy bones and teeth, normal blood clotting, and nervous system functioning, higher risk of osteoporosis and bone disease with non-compliance. Importance of Multivitamins: Many functions. Supply you with extra nutrients that you may be missing from food. May lead to iron deficiency anemia, weakness, fatigue, and many other symptoms with non-compliance. Importance of B Vitamins:  Important for red blood cell formation, metabolism, energy, and helps to maintain a healthy nervous system. Protein Supplement  Find one you like now. Use immediately after surgery. Look for:  35-50g protein each day from your protein supplement once you reach the progression diet. 0-3 g fat per serving  0-3 g sugar per serving    Protein drinks should be split in separate dosages. Recommend: Lifelong  1 year + Calcium Supplement:     Start taking within a month after surgery. Look for: Calcium Citrate Plus D (1500 mg per day)  Recommend: Citracal     .            Avoid chocolate chewable calcium. Can use chewable bariatric or [employer] brand or similar chewable. The body cannot absorb more than 500-600 mg of calcium at a time. Take for Life Multi-vitamin Supplement:      Start immediately after surgery: any complete chewable, such as: [employer]s Complete chewables. Avoid [employer] sours or gummies. They lack iron and other important nutrients and also have added sugar. Continue with chewable vitamin or change to adult complete multivitamin one month after surgery. Menstruating women can take a prenatal vitamin. Make sure has at least 18 mg iron and 746-099 mcg folic acid   Vitamin D87, B Complex Vitamin, and Biotin  Start taking within a month after surgery. Vitamin B12:  1000 mcg of Vitamin B12 three times weekly    Must take sublingually (meaning you take it under your tongue) or in a liquid drop form for easy absorption. B Complex Vitamin: Take a pill or liquid drop form once daily. Biotin: This vitamin can help prevent hair loss. Recommend 5mg   (5000 mcg) a day  Biotin is Optional              Body Mass Index: Care Instructions  Your Care Instructions    Body mass index (BMI) can help you see if your weight is raising your risk for health problems. It uses a formula to compare how much you weigh with how tall you are. · A BMI lower than 18.5 is considered underweight. · A BMI between 18.5 and 24.9 is considered healthy. · A BMI between 25 and 29.9 is considered overweight. A BMI of 30 or higher is considered obese.   If your BMI is in the normal range, it means that you have a lower risk for weight-related health problems. If your BMI is in the overweight or obese range, you may be at increased risk for weight-related health problems, such as high blood pressure, heart disease, stroke, arthritis or joint pain, and diabetes. If your BMI is in the underweight range, you may be at increased risk for health problems such as fatigue, lower protection (immunity) against illness, muscle loss, bone loss, hair loss, and hormone problems. BMI is just one measure of your risk for weight-related health problems. You may be at higher risk for health problems if you are not active, you eat an unhealthy diet, or you drink too much alcohol or use tobacco products. Follow-up care is a key part of your treatment and safety. Be sure to make and go to all appointments, and call your doctor if you are having problems. It's also a good idea to know your test results and keep a list of the medicines you take. How can you care for yourself at home? · Practice healthy eating habits. This includes eating plenty of fruits, vegetables, whole grains, lean protein, and low-fat dairy. · If your doctor recommends it, get more exercise. Walking is a good choice. Bit by bit, increase the amount you walk every day. Try for at least 30 minutes on most days of the week. · Do not smoke. Smoking can increase your risk for health problems. If you need help quitting, talk to your doctor about stop-smoking programs and medicines. These can increase your chances of quitting for good. · Limit alcohol to 2 drinks a day for men and 1 drink a day for women. Too much alcohol can cause health problems. If you have a BMI higher than 25  · Your doctor may do other tests to check your risk for weight-related health problems.  This may include measuring the distance around your waist. A waist measurement of more than 40 inches in men or 35 inches in women can increase the risk of weight-related health problems. · Talk with your doctor about steps you can take to stay healthy or improve your health. You may need to make lifestyle changes to lose weight and stay healthy, such as changing your diet and getting regular exercise. If you have a BMI lower than 18.5  · Your doctor may do other tests to check your risk for health problems. · Talk with your doctor about steps you can take to stay healthy or improve your health. You may need to make lifestyle changes to gain or maintain weight and stay healthy, such as getting more healthy foods in your diet and doing exercises to build muscle. Where can you learn more? Go to http://jenna-mary.info/. Enter S176 in the search box to learn more about \"Body Mass Index: Care Instructions. \"  Current as of: October 13, 2016  Content Version: 11.4  © 3333-8414 [employer]. Care instructions adapted under license by [employer] (which disclaims liability or warranty for this information). If you have questions about a medical condition or this instruction, always ask your healthcare professional. [address] any warranty or liability for your use of this information.

## 2017-12-08 NOTE — PROGRESS NOTES
Subjective:      Saadia Mccann is a 38 y.o. female is now 1 years status post conversion from lap band to laparoscopic gastric bypass surgery Doing well overall. She has lost a total of 81 pounds since surgery. Body mass index is 33.32 kg/(m^2). Has lost 53% of EBW. Currently on a solid food diet without difficulty, reports no issues and denies vomiting and abdominal pain. Taking in 60-90oz water daily. Sources of protein include chicken, ham, cheese, tuna. Has not been exercising, she gave up her gym membership. She states that she will start walking for exercise. Patient states that she is able to eat more food at one sitting now and that she has to keep herself in check to not over do it. She has also been eating bread and codie cookies which she knows will cause weight gain. Bowel movements are constipated. The patient is not having any pain. . The patient is compliant with multivitamins, calcium, Vit D and B12 supplements.      Weight Loss Metrics 12/8/2017 8/30/2017 5/31/2017 3/28/2017 1/25/2017 12/13/2016 11/29/2016   Pre op / Initial Wt - - - - - - -   Today's Wt 208 lb 205 lb 212 lb 225 lb 245 lb 260 lb 268 lb   BMI 33.32 kg/m2 32.84 kg/m2 33.96 kg/m2 36.04 kg/m2 39.25 kg/m2 41.65 kg/m2 42.93 kg/m2   Ideal Body Wt - - - - - - -          Comorbidities:    Hypertension: not applicable  Diabetes: not applicable  Obstructive Sleep Apnea: not applicable  Hyperlipidemia: not applicable  Stress Urinary Incontinence: not applicable  Gastroesophageal Reflux: resolved  Weight related arthropathy:unchanged     Patient Active Problem List   Diagnosis Code    Hypothyroidism E03.9    PCOS (polycystic ovarian syndrome) E28.2    Anxiety F41.9    Arthritis M19.90    S/P bariatric surgery Z98.84    Intestinal malabsorption K90.9        Past Medical History:   Diagnosis Date    Anxiety     Arthritis     gestational diabetes     Hypothyroidism     Morbid obesity ([address]     Morbid obesity with BMI of 40.0-44.9, adult (HCC)     Nausea & vomiting     PCOS (polycystic ovarian syndrome)     no medication as of 10/16    Psychiatric disorder     depression    Seasonal allergic rhinitis        Past Surgical History:   Procedure Laterality Date    HX GI  march 2010    lap band    HX GI  07/2016    removal of gastric band    HX GYN      dx laparoscopy    HX KNEE ARTHROSCOPY Left     x 3    LAP GASTRIC BYPASS/KRYS-EN-Y      11/15/2016       Current Outpatient Prescriptions   Medication Sig Dispense Refill    PNV38-Iron Cbn&Gluc-FA-DSS-DHA 35-1- mg cmpk Take  by mouth.  ergocalciferol (ERGOCALCIFEROL) 50,000 unit capsule Take 1 Cap by mouth two (2) times a week for 180 days. Indications: VITAMIN D DEFICIENCY (HIGH DOSE THERAPY) 52 Cap 1    Biotin 2,500 mcg cap Take  by mouth.  calcium citrate-vitamin d3 (CITRACAL+D) 315-200 mg-unit tab Take 1 Tab by mouth daily (with breakfast).  cyanocobalamin (VITAMIN B-12) 1,000 mcg sublingual tablet Take 1,000 mcg by mouth daily.  b complex vitamins tablet Take 1 Tab by mouth daily.  levothyroxine (SYNTHROID) 150 mcg tablet Take 150 mcg by mouth Daily (before breakfast).  sertraline (ZOLOFT) 50 mg tablet Take 50 mg by mouth daily.          Allergies   Allergen Reactions    Codeine Nausea and Vomiting and Other (comments)     dizziness       Review of Systems:  General - No history or complaints of unexpected fever or chills  Head/Neck - No history or complaints of headache or dizziness  Cardiac - No history or complaints of chest pain, palpitations, or shortness of breath  Pulmonary - No history or complaints of shortness of breath or productive cough  Gastrointestinal - as noted above  Genitourinary - No history or complaints of hematuria/dysuria or renal lithiasis  Musculoskeletal - No history or complaints of joint  muscular weakness  Hematologic - No history of any bleeding episodes  Neurologic - No history or complaints of  migraine headaches or neurologic symptoms    Objective:     Visit Vitals    /80 (BP 1 Location: Left arm, BP Patient Position: Sitting)    Pulse 70    Ht 5' 6.25\" (1.683 m)    Wt 94.3 kg (208 lb)    SpO2 100%    BMI 33.32 kg/m2       General:  alert, cooperative, no distress, appears stated age   Chest: lungs clear to auscultation, no rhonchi, rales or wheezes, no accessory muscle use   Cor:   Regular rate and rhythm or without murmur or extra heart sounds   Abdomen: soft, bowel sounds active, non-tender, no masses or organomegaly   Incisions:   healing well, no drainage, no erythema, no hernia, no seroma, no swelling, no dehiscence, incision well approximated       Assessment:   History of Morbid obesity, status post conversion from lap band to laparoscopic gastric bypass surgery. Doing well postoperatively. Plan:     1. Increase activity to the goal of 30 minutes daily  2. Discussed patients weight loss goals and dietary choices in relation to goals. 3. Reminded to measure portions, continue high protein, low carbohydrate diet. Reminded to eat regularly, to eat slowly & not to drink with meals. 4. Continue vitamin supplementation  5. Continue current medications and follow up with PCP for management of regimen. 6. Continue cardio exercise and add resistance exercises. 60-90 minutes of aerobic activity 5 days a week and strength training 2 days each week. 7. Encouraged to attend support group   8. Patient to complete labs before next visit. Lab slip given today. 9. I have discussed this plan with patient and they verbalized understanding  10. Follow up in 1 years or sooner if patient has questions, concerns or worsening of condition, if unable to reach our office, patient should report to the ED. 6. Ms. Tona White has a reminder for a \"due or due soon\" health maintenance. I have asked that she contact her primary care provider for a follow-up on this health maintenance.

## 2018-07-12 ENCOUNTER — OFFICE VISIT (OUTPATIENT)
Dept: SURGERY | Age: 43
End: 2018-07-12

## 2018-07-12 DIAGNOSIS — Z98.84 S/P BARIATRIC SURGERY: ICD-10-CM

## 2018-07-12 DIAGNOSIS — K90.9 INTESTINAL MALABSORPTION, UNSPECIFIED TYPE: Primary | ICD-10-CM

## 2018-11-29 NOTE — PROGRESS NOTES
A user error has taken place: patient did not show for appt. There was a glitch in the system which is requiring me to add this and close the note with code [id number]. Brianna Bowers

## 2018-12-26 ENCOUNTER — CLINICAL SUPPORT (OUTPATIENT)
Dept: INTERNAL MEDICINE CLINIC | Age: 43
End: 2018-12-26

## 2018-12-26 VITALS
HEIGHT: 66 IN | TEMPERATURE: 98.4 F | OXYGEN SATURATION: 98 % | HEART RATE: 71 BPM | SYSTOLIC BLOOD PRESSURE: 110 MMHG | DIASTOLIC BLOOD PRESSURE: 70 MMHG | BODY MASS INDEX: 35.68 KG/M2 | RESPIRATION RATE: 16 BRPM | WEIGHT: 222 LBS

## 2018-12-26 DIAGNOSIS — E03.9 HYPOTHYROIDISM, UNSPECIFIED TYPE: ICD-10-CM

## 2018-12-26 DIAGNOSIS — Z00.00 ROUTINE ADULT HEALTH MAINTENANCE: Primary | ICD-10-CM

## 2018-12-26 DIAGNOSIS — E66.9 OBESITY (BMI 35.0-39.9 WITHOUT COMORBIDITY): ICD-10-CM

## 2018-12-26 DIAGNOSIS — Z98.84 S/P BARIATRIC SURGERY: ICD-10-CM

## 2018-12-26 PROBLEM — F32.A DEPRESSIVE DISORDER: Status: ACTIVE | Noted: 2018-12-26

## 2018-12-26 RX ORDER — FLUTICASONE PROPIONATE 50 MCG
SPRAY, SUSPENSION (ML) NASAL
Refills: 2 | COMMUNITY
Start: 2018-12-22 | End: 2020-02-13 | Stop reason: SDUPTHER

## 2018-12-26 RX ORDER — CETIRIZINE HCL 10 MG
TABLET ORAL
Refills: 2 | COMMUNITY
Start: 2018-12-22

## 2018-12-26 RX ORDER — GLUCOSAMINE SULFATE 1500 MG
POWDER IN PACKET (EA) ORAL
COMMUNITY
End: 2019-07-02

## 2018-12-26 NOTE — PROGRESS NOTES
[address] Darylene Seashore presents today for   Chief Complaint   Patient presents with   [address] [place] [address] Drive preferred language for health care discussion is english/other. Depression Screening:  PHQ over the last two weeks 12/26/2018   Little interest or pleasure in doing things Not at all   Feeling down, depressed, irritable, or hopeless Not at all   Total Score PHQ 2 0       Learning Assessment:  Learning Assessment 6/10/2016   PRIMARY LEARNER Patient   HIGHEST LEVEL OF EDUCATION - PRIMARY LEARNER  4 YEARS OF COLLEGE   BARRIERS PRIMARY LEARNER NONE   PRIMARY LANGUAGE ENGLISH   LEARNER PREFERENCE PRIMARY DEMONSTRATION   ANSWERED BY patient   RELATIONSHIP SELF       Abuse Screening:  No flowsheet data found. Fall Risk  No flowsheet data found. Visit Vitals  /70 (BP 1 Location: Left arm, BP Patient Position: Sitting)   Pulse 71   Temp 98.4 °F (36.9 °C) (Oral)   Resp 16   Ht 5' 6\" (1.676 m)   Wt 222 lb (100.7 kg)   SpO2 98%   BMI 35.83 kg/m²       Health Maintenance reviewed and discussed per provider. Yes    Nadege Lopez is due for   Health Maintenance Due   Topic Date Due    PAP AKA CERVICAL CYTOLOGY  04/08/2016    Influenza Age 5 to Adult  08/01/2018     Please order/place referral if appropriate. Advance Directive:  1. Do you have an advance directive in place? Patient Reply: No    2. If not, would you like material regarding how to put one in place? Patient Reply: No    Coordination of Care:  1. Have you been to the ER, urgent care clinic since your last visit? Hospitalized since your last visit?  No

## 2018-12-26 NOTE — PROGRESS NOTES
Mariann Heath is a 37 y.o.  female and presents with    Chief Complaint   Patient presents with    Establish Care       Subjective:  HPI   Mrs. Mindy Stanton is here today to establish care and have labs completed for her thyroid. Mrs. Mindy Stanton is  with 3 children. She is with a history of lap bad in 2010 and gastric bypass 2016 with Dr. 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 Gestational diabetes    She sees gynecologist is [place], [facility]. ablation performed. Last mammogram 2/2016 showed 4 mm left breast asymmetry, Diagnostic mammogram 4/2016 impression reported: In the superolateral quadrant of left breast at the junction of middle zone and deep zone that is small nodule, measuring up to 8 mm in diameter, with morphologic characteristics suggestive of a lymph node. This is likely to be benign finding. A follow-up digital diagnostic mammograms of left breast in 6  months, is recommended. This was not completed. She is with a history of lap bad in 2010 and gastric bypass 2016 with Dr. Saintclair Ganja. She is taking B complex and Vitamin D. She reports has not been followed in a while and needs to make an appointment. Her gynecologist was refilling her thyroid medication in the past.     Additional Concerns: none     ROS   Review of Systems   Constitutional: Negative. HENT: Negative. Eyes: Negative. Respiratory: Negative. Cardiovascular: Negative. Gastrointestinal: Negative. Genitourinary: Negative. Musculoskeletal: Negative. Skin: Negative. Neurological: Negative. Endo/Heme/Allergies: Negative. Psychiatric/Behavioral: Negative.         Allergies   Allergen Reactions    Codeine Nausea and Vomiting and Other (comments)     dizziness       Current Outpatient Medications   Medication Sig Dispense Refill    cetirizine (ZYRTEC) 10 mg tablet TAKE 1 TABLET (10 MG TOTAL) BY MOUTH DAILY  2    cholecalciferol (VITAMIN D3) 1,000 unit cap Vitamin D3 1,000 unit capsule      fluticasone (FLONASE) 50 mcg/actuation nasal spray SPRAY 1 SPRAY INTO EACH NOSTRIL EVERY DAY  2    PNV38-Iron Cbn&Gluc-FA-DSS-DHA 35-1- mg cmpk Take  by mouth.  calcium citrate-vitamin d3 (CITRACAL+D) 315-200 mg-unit tab Take 1 Tab by mouth daily (with breakfast).  cyanocobalamin (VITAMIN B-12) 1,000 mcg sublingual tablet Take 1,000 mcg by mouth daily.  b complex vitamins tablet Take 1 Tab by mouth daily.  levothyroxine (SYNTHROID) 150 mcg tablet Take 88 mcg by mouth Daily (before breakfast).  sertraline (ZOLOFT) 50 mg tablet Take 50 mg by mouth daily. Social History     Socioeconomic History    Marital status:      Spouse name: Not on file    Number of children: Not on file    Years of education: Not on file    Highest education level: Not on file   Social Needs    Financial resource strain: Not on file    Food insecurity - worry: Not on file    Food insecurity - inability: Not on file    Transportation needs - medical: Not on file   [employer] needs - non-medical: Not on file   Occupational History    Not on file   Tobacco Use    Smoking status: Never Smoker    Smokeless tobacco: Never Used   Substance and Sexual Activity    Alcohol use:  Yes     Alcohol/week: 0.6 - 1.2 oz     Types: 1 - 2 Glasses of wine per week    Drug use: No    Sexual activity: Yes     Partners: Male   Other Topics Concern    Not on file   Social History Narrative    Not on file       Past Medical History:   Diagnosis Date    Anxiety     Arthritis     Depression     Gastric bypass status for obesity 2016    gestational diabetes     Hypothyroidism     Morbid obesity ([facility] [address]     Morbid obesity with BMI of 40.0-44.9, adult ([facility])     Nausea & vomiting     PCOS (polycystic ovarian syndrome)     no medication as of 10/16    Psychiatric disorder     depression    Seasonal allergic rhinitis Past Surgical History:   Procedure Laterality Date    HX GI  march 2010    lap band    HX GI  07/2016    removal of gastric band    HX GYN      dx laparoscopy    HX KNEE ARTHROSCOPY Left     x 3    LAP GASTRIC BYPASS/KRYS-EN-Y      11/15/2016       Family History   Problem Relation Age of Onset    Arthritis-osteo Mother     Asthma Mother     Heart Disease Father     Diabetes Father     No Known Problems Sister     Diabetes Maternal Grandfather        Objective:  Vitals:    12/26/18 1327   BP: 110/70   Pulse: 71   Resp: 16   Temp: 98.4 °F (36.9 °C)   TempSrc: Oral   SpO2: 98%   Weight: 222 lb (100.7 kg)   Height: 5' 6\" (1.676 m)   PainSc:   0 - No pain       LABS   Results for orders placed or performed during the hospital encounter of 05/31/17   VITAMIN B12 & FOLATE   Result Value Ref Range    Vitamin B12 1,384 (H) 211 - 911 pg/mL    Folate >20.0 (H) 3.10 - 17.50 ng/mL   CBC WITH AUTOMATED DIFF   Result Value Ref Range    WBC 8.0 4.6 - 13.2 K/uL    RBC 5.09 4. 20 - 5.30 M/uL    HGB 13.7 12.0 - 16.0 g/dL    HCT 41.6 35.0 - 45.0 %    MCV 81.7 74.0 - 97.0 FL    MCH 26.9 24.0 - 34.0 PG    MCHC 32.9 31.0 - 37.0 g/dL    RDW 14.8 (H) 11.6 - 14.5 %    PLATELET 501 131 - 277 K/uL    MPV 11.2 9.2 - 11.8 FL    NEUTROPHILS 60 40 - 73 %    LYMPHOCYTES 33 21 - 52 %    MONOCYTES 5 3 - 10 %    EOSINOPHILS 2 0 - 5 %    BASOPHILS 0 0 - 2 %    ABS. NEUTROPHILS 4.9 1.8 - 8.0 K/UL    ABS. LYMPHOCYTES 2.6 0.9 - 3.6 K/UL    ABS. MONOCYTES 0.4 0.05 - 1.2 K/UL    ABS. EOSINOPHILS 0.1 0.0 - 0.4 K/UL    ABS.  BASOPHILS 0.0 0.0 - 0.06 K/UL    DF AUTOMATED     FERRITIN   Result Value Ref Range    Ferritin 13 8 - 388 NG/ML   IRON   Result Value Ref Range    Iron 68 50 - 937 ug/dL   METABOLIC PANEL, COMPREHENSIVE   Result Value Ref Range    Sodium 141 136 - 145 mmol/L    Potassium 4.7 3.5 - 5.5 mmol/L    Chloride 105 100 - 108 mmol/L    CO2 29 21 - 32 mmol/L    Anion gap 7 3.0 - 18 mmol/L    Glucose 88 74 - 99 mg/dL    BUN 16 7.0 - 18 MG/DL    Creatinine 0.86 0.6 - 1.3 MG/DL    BUN/Creatinine ratio 19 12 - 20      GFR est AA >60 >60 ml/min/1.73m2    GFR est non-AA >60 >60 ml/min/1.73m2    Calcium 9.1 8.5 - 10.1 MG/DL    Bilirubin, total 0.4 0.2 - 1.0 MG/DL    ALT (SGPT) 18 13 - 56 U/L    AST (SGOT) 16 15 - 37 U/L    Alk. phosphatase 74 45 - 117 U/L    Protein, total 7.6 6.4 - 8.2 g/dL    Albumin 3.7 3.4 - 5.0 g/dL    Globulin 3.9 2.0 - 4.0 g/dL    A-G Ratio 0.9 0.8 - 1.7     TSH 3RD GENERATION   Result Value Ref Range    TSH 2.97 0.36 - 3.74 uIU/mL   VITAMIN B1, WHOLE BLOOD   Result Value Ref Range    Vitamin B1 119.3 66.5 - 200.0 nmol/L   VITAMIN D, 25 HYDROXY   Result Value Ref Range    Vitamin D 25-Hydroxy 28.3 (L) 30 - 100 ng/mL       TESTS  none    PE  Physical Exam   Constitutional: She is oriented to person, place, and time. She appears well-developed and well-nourished. No distress. HENT:   Head: Normocephalic and atraumatic. Right Ear: External ear normal.   Left Ear: External ear normal.   Nose: Nose normal.   Mouth/Throat: Oropharynx is clear and moist. No oropharyngeal exudate. Eyes: Conjunctivae and EOM are normal. Pupils are equal, round, and reactive to light. Right eye exhibits no discharge. Left eye exhibits no discharge. Neck: Normal range of motion. Cardiovascular: Normal rate, regular rhythm, normal heart sounds and intact distal pulses. No murmur heard. Pulmonary/Chest: Effort normal and breath sounds normal. No respiratory distress. She has no wheezes. She has no rales. She exhibits no tenderness. Abdominal: Soft. Bowel sounds are normal. She exhibits no distension. There is no tenderness. Musculoskeletal: Normal range of motion. She exhibits no edema or tenderness. Lymphadenopathy:     She has no cervical adenopathy. Neurological: She is alert and oriented to person, place, and time. She has normal reflexes. No cranial nerve deficit. Coordination normal.   Skin: Skin is warm and dry.  She is not diaphoretic. Psychiatric: She has a normal mood and affect. Her behavior is normal. Judgment and thought content normal.   Vitals reviewed. Assessment/Plan:    1. Establish care- CPE and labs- when fasting. HM discussed as below. 2. Hypothyroidism- TSH and T4 levels ordered. 6 month follow up unless otherwise indicated by labs. 3. Gastric bypass- Re-establish with . Lab review: orders written for new lab studies as appropriate; see orders    Today's Visit:   Diagnoses and all orders for this visit:    1. Routine adult health maintenance  -     CBC WITH AUTOMATED DIFF; Future  -     METABOLIC PANEL, COMPREHENSIVE; Future  -     HEMOGLOBIN A1C WITH EAG; Future  -     LIPID PANEL; Future  -     MICROALBUMIN, UR, RAND W/ MICROALB/CREAT RATIO; Future  -     T4, FREE; Future  -     TSH 3RD GENERATION; Future  -     URINALYSIS W/ RFLX MICROSCOPIC; Future    2. Hypothyroidism, unspecified type  -     T4, FREE; Future  -     TSH 3RD GENERATION; Future    3. S/P bariatric surgery  -     CBC WITH AUTOMATED DIFF; Future  -     METABOLIC PANEL, COMPREHENSIVE; Future  -     HEMOGLOBIN A1C WITH EAG; Future  -     LIPID PANEL; Future  -     MICROALBUMIN, UR, RAND W/ MICROALB/CREAT RATIO; Future    4. Obesity (BMI 35.0-39.9 without comorbidity)  -     CBC WITH AUTOMATED DIFF; Future  -     METABOLIC PANEL, COMPREHENSIVE; Future  -     HEMOGLOBIN A1C WITH EAG; Future  -     LIPID PANEL; Future  -     MICROALBUMIN, UR, RAND W/ MICROALB/CREAT RATIO; Future  -     T4, FREE; Future  -     TSH 3RD GENERATION; Future  -     URINALYSIS W/ RFLX MICROSCOPIC; Future      Health Maintenance: Recommended to schedule Pap with gynecology. Mammogram ordered. I have discussed the diagnosis with the patient and the intended plan as seen in the above orders. The patient has received an after-visit summary and questions were answered concerning future plans.   I have discussed medication side effects and warnings with the patient as well. I have reviewed the plan of care with the patient, accepted their input and they are in agreement with the treatment goals. Follow-up Disposition: Not on File   More than 1/2 of this 60 minute visit was spent in counseling and coordination of care, as described above.     ALLAN Joshi  Internist of [address], [address] [place] [address].  Phone: [phone]  Fax: [phone]

## 2019-01-28 NOTE — TELEPHONE ENCOUNTER
PCP: Keo Monet NP    Last appt: 12/26/2018  Future Appointments   Date Time Provider Michelle Barnhart   6/28/2019  8:00 AM Jerson Pina NP [facility] SCHED       Requested Prescriptions     Pending Prescriptions Disp Refills    levothyroxine (SYNTHROID) 88 mcg tablet 30 Tab 5     Sig: Take 1 Tab by mouth Daily (before breakfast). No labs completed.

## 2019-01-29 RX ORDER — LEVOTHYROXINE SODIUM 88 UG/1
88 TABLET ORAL
Qty: 30 TAB | Refills: 5 | OUTPATIENT
Start: 2019-01-29

## 2019-01-29 NOTE — TELEPHONE ENCOUNTER
Please tell the patient I need her to complete labs prior to refilling medications, labs are in the system, she needs to fast

## 2019-02-19 ENCOUNTER — HOSPITAL ENCOUNTER (OUTPATIENT)
Dept: LAB | Age: 44
Discharge: HOME | End: 2019-02-19
Payer: COMMERCIAL

## 2019-02-19 DIAGNOSIS — Z00.00 ROUTINE ADULT HEALTH MAINTENANCE: ICD-10-CM

## 2019-02-19 DIAGNOSIS — E66.9 OBESITY (BMI 35.0-39.9 WITHOUT COMORBIDITY): ICD-10-CM

## 2019-02-19 DIAGNOSIS — Z98.84 S/P BARIATRIC SURGERY: ICD-10-CM

## 2019-02-19 DIAGNOSIS — E03.9 HYPOTHYROIDISM, UNSPECIFIED TYPE: ICD-10-CM

## 2019-02-19 LAB
ALBUMIN SERPL-MCNC: 3.7 G/DL (ref 3.4–5)
ALBUMIN/GLOB SERPL: 1.1 {RATIO} (ref 0.8–1.7)
ALP SERPL-CCNC: 84 U/L (ref 45–117)
ALT SERPL-CCNC: 16 U/L (ref 13–56)
ANION GAP SERPL CALC-SCNC: 6 MMOL/L (ref 3–18)
APPEARANCE UR: ABNORMAL
AST SERPL-CCNC: 13 U/L (ref 15–37)
BACTERIA URNS QL MICRO: ABNORMAL /HPF
BASOPHILS # BLD: 0 K/UL (ref 0–0.1)
BASOPHILS NFR BLD: 0 % (ref 0–2)
BILIRUB SERPL-MCNC: 0.5 MG/DL (ref 0.2–1)
BILIRUB UR QL: NEGATIVE
BUN SERPL-MCNC: 12 MG/DL (ref 7–18)
BUN/CREAT SERPL: 18 (ref 12–20)
CALCIUM SERPL-MCNC: 8.8 MG/DL (ref 8.5–10.1)
CAOX CRY URNS QL MICRO: ABNORMAL
CHLORIDE SERPL-SCNC: 106 MMOL/L (ref 100–108)
CHOLEST SERPL-MCNC: 237 MG/DL
CO2 SERPL-SCNC: 30 MMOL/L (ref 21–32)
COLOR UR: ABNORMAL
CREAT SERPL-MCNC: 0.68 MG/DL (ref 0.6–1.3)
CREAT UR-MCNC: 265 MG/DL (ref 30–125)
DIFFERENTIAL METHOD BLD: ABNORMAL
EOSINOPHIL # BLD: 0.1 K/UL (ref 0–0.4)
EOSINOPHIL NFR BLD: 2 % (ref 0–5)
EPITH CASTS URNS QL MICRO: ABNORMAL /LPF (ref 0–5)
ERYTHROCYTE [DISTWIDTH] IN BLOOD BY AUTOMATED COUNT: 15 % (ref 11.6–14.5)
EST. AVERAGE GLUCOSE BLD GHB EST-MCNC: 114 MG/DL
GLOBULIN SER CALC-MCNC: 3.3 G/DL (ref 2–4)
GLUCOSE SERPL-MCNC: 90 MG/DL (ref 74–99)
GLUCOSE UR STRIP.AUTO-MCNC: NEGATIVE MG/DL
HBA1C MFR BLD: 5.6 % (ref 4.2–5.6)
HCT VFR BLD AUTO: 39.6 % (ref 35–45)
HDLC SERPL-MCNC: 58 MG/DL (ref 40–60)
HDLC SERPL: 4.1 {RATIO} (ref 0–5)
HGB BLD-MCNC: 12.5 G/DL (ref 12–16)
HGB UR QL STRIP: NEGATIVE
KETONES UR QL STRIP.AUTO: ABNORMAL MG/DL
LDLC SERPL CALC-MCNC: 144.6 MG/DL (ref 0–100)
LEUKOCYTE ESTERASE UR QL STRIP.AUTO: ABNORMAL
LIPID PROFILE,FLP: ABNORMAL
LYMPHOCYTES # BLD: 2.1 K/UL (ref 0.9–3.6)
LYMPHOCYTES NFR BLD: 30 % (ref 21–52)
MCH RBC QN AUTO: 25.1 PG (ref 24–34)
MCHC RBC AUTO-ENTMCNC: 31.6 G/DL (ref 31–37)
MCV RBC AUTO: 79.4 FL (ref 74–97)
MICROALBUMIN UR-MCNC: 1.62 MG/DL (ref 0–3)
MICROALBUMIN/CREAT UR-RTO: 6 MG/G (ref 0–30)
MONOCYTES # BLD: 0.5 K/UL (ref 0.05–1.2)
MONOCYTES NFR BLD: 7 % (ref 3–10)
MUCOUS THREADS URNS QL MICRO: ABNORMAL /LPF
NEUTS SEG # BLD: 4.4 K/UL (ref 1.8–8)
NEUTS SEG NFR BLD: 61 % (ref 40–73)
NITRITE UR QL STRIP.AUTO: NEGATIVE
PH UR STRIP: 6 [PH] (ref 5–8)
PLATELET # BLD AUTO: 259 K/UL (ref 135–420)
PMV BLD AUTO: 10.9 FL (ref 9.2–11.8)
POTASSIUM SERPL-SCNC: 3.7 MMOL/L (ref 3.5–5.5)
PROT SERPL-MCNC: 7 G/DL (ref 6.4–8.2)
PROT UR STRIP-MCNC: NEGATIVE MG/DL
RBC # BLD AUTO: 4.99 M/UL (ref 4.2–5.3)
RBC #/AREA URNS HPF: ABNORMAL /HPF (ref 0–5)
SODIUM SERPL-SCNC: 142 MMOL/L (ref 136–145)
SP GR UR REFRACTOMETRY: 1.02 (ref 1–1.03)
T4 FREE SERPL-MCNC: 0.7 NG/DL (ref 0.7–1.5)
TRIGL SERPL-MCNC: 172 MG/DL (ref ?–150)
TSH SERPL DL<=0.05 MIU/L-ACNC: 6.36 UIU/ML (ref 0.36–3.74)
UROBILINOGEN UR QL STRIP.AUTO: 1 EU/DL (ref 0.2–1)
VLDLC SERPL CALC-MCNC: 34.4 MG/DL
WBC # BLD AUTO: 7.2 K/UL (ref 4.6–13.2)
WBC URNS QL MICRO: ABNORMAL /HPF (ref 0–4)

## 2019-02-19 PROCEDURE — 81001 URINALYSIS AUTO W/SCOPE: CPT

## 2019-02-19 PROCEDURE — 36415 COLL VENOUS BLD VENIPUNCTURE: CPT

## 2019-02-19 PROCEDURE — 82043 UR ALBUMIN QUANTITATIVE: CPT

## 2019-02-19 PROCEDURE — 83036 HEMOGLOBIN GLYCOSYLATED A1C: CPT

## 2019-02-19 PROCEDURE — 84443 ASSAY THYROID STIM HORMONE: CPT

## 2019-02-19 PROCEDURE — 84439 ASSAY OF FREE THYROXINE: CPT

## 2019-02-19 PROCEDURE — 80053 COMPREHEN METABOLIC PANEL: CPT

## 2019-02-19 PROCEDURE — 85025 COMPLETE CBC W/AUTO DIFF WBC: CPT

## 2019-02-19 PROCEDURE — 80061 LIPID PANEL: CPT

## 2019-02-21 NOTE — TELEPHONE ENCOUNTER
Patient needs a refill on her Synthroid. She had her labs done on Monday.  Can you please review those and call in to [facility]?

## 2019-02-21 NOTE — PROGRESS NOTES
Please inform her that labs show A1C is normal range, electrolytes, kidney and liver look good, cholesterol shows elevated  needed to be below 100, however HDL 58 helps decrease risk, triglycerides are elevated showing intake of too many carbohydrates, sweets, rice, pasta, bread, soda etc- first line therapy is diet and exercise , TSH is high at 6.36 and T4 is normal- has she missed any doses of her medication?  CBC is normal    ASCVD risk 0.7%

## 2019-02-22 RX ORDER — LEVOTHYROXINE SODIUM 88 UG/1
88 TABLET ORAL
Qty: 30 TAB | Refills: 5 | Status: SHIPPED | OUTPATIENT
Start: 2019-02-22 | End: 2019-07-02 | Stop reason: DRUGHIGH

## 2019-02-22 NOTE — TELEPHONE ENCOUNTER
pLease inform the patient that I am refilling the medications as previously prescribed but please let her know in the future do not wait to contact me if she will be out of medication regardless if labs need to be done. I need to see what the labs look like while she is on this dose to make sure it is the appropriate dose for her because now it is just telling me that her body is low because she hasn't been taking it.

## 2019-02-22 NOTE — TELEPHONE ENCOUNTER
----- Message from Sharonda Sun NP sent at 2/21/2019  4:04 PM EST -----  Please inform her that labs show A1C is normal range, electrolytes, kidney and liver look good, cholesterol shows elevated  needed to be below 100, however HDL 58 helps decrease risk, triglycerides are elevated showing intake of too many carbohydrates, sweets, rice, pasta, bread, soda etc- first line therapy is diet and exercise , TSH is high at 6.36 and T4 is normal- has she missed any doses of her medication?  CBC is normal    ASCVD risk 0.7%

## 2019-03-13 RX ORDER — SERTRALINE HYDROCHLORIDE 50 MG/1
50 TABLET, FILM COATED ORAL DAILY
Qty: 90 TAB | Refills: 1 | Status: SHIPPED | OUTPATIENT
Start: 2019-03-13 | End: 2019-06-28

## 2019-03-13 NOTE — TELEPHONE ENCOUNTER
Last Visit: 12/26/2018 with SKINNY Hillman  Next Appointment: 06/28/2019 with SKINNY Pina    Requested Prescriptions     Pending Prescriptions Disp Refills    sertraline (ZOLOFT) 50 mg tablet 90 Tab 1     Sig: Take 1 Tab by mouth daily.

## 2019-05-13 ENCOUNTER — CLINICAL SUPPORT (OUTPATIENT)
Dept: SURGERY | Age: 44
End: 2019-05-13

## 2019-05-13 VITALS — HEIGHT: 66 IN | BODY MASS INDEX: 37.61 KG/M2 | WEIGHT: 234 LBS

## 2019-05-13 DIAGNOSIS — Z98.84 S/P BARIATRIC SURGERY: Primary | ICD-10-CM

## 2019-05-13 NOTE — PROGRESS NOTES
Anna Villafana is a 37 y.o. female who is 2.5 years s/p lap band to RYGB. The patient has lost 55 lbs lbs since surgery. Body mass index is 37.77 kg/m². The patient has lost 36% EBW. Patient last follow us was in December 2017, since that time patient has gained ~26 lbs. She presents today to assist with eating behaviors and patterns to assist with weight loss. Vitamins: Women's MVI, B12 (not as recommended)- not taking calcium citrate OR vitamin D3. Reinforced importance of lifelong vitamin and mineral supplementation and reviewed recommended regimen. Diet History:  Typical intake is as follows:  Breakfast: Matrix protein powder OR 1 premier protein shake  Lunch: Leftovers - baked chicken, pork steak  with asparagus  with mashed potatoes   Snack: cheezits, almonds  Dinner: Home  - 3 oz protein with 1/2-3/4 cup vegetables  Snacks: late night snack - 11 pm sweets, chips - pretzels  Fluids: 64 -72 oz water, 12-20 oz Darron diet green tea, coffee with SF creamer    Do you currently have an exercise routine? yes  What kind of exercise do you do? YAS - cardio activity . For how long? 1 hour For how often? 4 days per week    Today the majority of our visit was spent reviewing patient's food recall and identifying areas for improvement. Educated  on the importance of eating 3-4 small meals/day at regularly scheduled times including breakfast within 1st 1-2 hours of waking. Suggested patient use a protein supplement as a meal replacement instead of skipping OR as a between meal high protein snack. Also educated on the importance of including a protein with every meal and snack and reviewed lean sources. Lastly introduced  the Plate Method for Meal Planning and used food models to assist with visualizing recommended serving sizes. Reviewed recommendation for 3 oz protein at meals and 1-2 oz non starchy vegetables.   Recommend patient reduce/eliminate carbohydrate portion at snacks and limit at meals.     Goals:   1. Work to increase to 3-4 small meals per day. .  Recommend following plate method for meal planning - focusing on lean protein, non-starchy vegetables, and limiting carbohydrate portion  - Goal of  g protein and 50 g carbohydrate per day. -Recommend eliminating late pm snack   -Aim to increase protein portion at afternoon snack and reduce CHO   -Increase protein at meals to 3 oz of lean protein    2. Continue with 64 oz non caloric fluid. Avoid added sugar and limit caffeine and carbonation. Reinstate 30:30 rule for  food and fluid. 3. Add 3000 international unit  Vitamin D3 - 1x/day, 1000 mcg sublingual b12 daily, and 1500 mg Calcium citrate - taken in 3 divided doses-separate from MVI by at least 2 hours and take each dose ~ 2 hours apart. 4. Continue with 150 minutes of activity per week. Continue to increase as able.     F/u: July 2019   Danilo Singh RD

## 2019-06-28 ENCOUNTER — HOSPITAL ENCOUNTER (OUTPATIENT)
Dept: LAB | Age: 44
Discharge: HOME | End: 2019-06-28
Payer: COMMERCIAL

## 2019-06-28 ENCOUNTER — OFFICE VISIT (OUTPATIENT)
Dept: INTERNAL MEDICINE CLINIC | Age: 44
End: 2019-06-28

## 2019-06-28 VITALS
SYSTOLIC BLOOD PRESSURE: 135 MMHG | BODY MASS INDEX: 37.28 KG/M2 | HEART RATE: 81 BPM | OXYGEN SATURATION: 98 % | TEMPERATURE: 98.2 F | DIASTOLIC BLOOD PRESSURE: 87 MMHG | WEIGHT: 232 LBS | HEIGHT: 66 IN | RESPIRATION RATE: 14 BRPM

## 2019-06-28 DIAGNOSIS — L67.8 ABNORMAL FACIAL HAIR: ICD-10-CM

## 2019-06-28 DIAGNOSIS — E66.01 SEVERE OBESITY (HCC): ICD-10-CM

## 2019-06-28 DIAGNOSIS — L68.9 EXCESS BODY AND FACIAL HAIR: ICD-10-CM

## 2019-06-28 DIAGNOSIS — L65.9 HAIR LOSS: ICD-10-CM

## 2019-06-28 DIAGNOSIS — E03.9 HYPOTHYROIDISM, UNSPECIFIED TYPE: ICD-10-CM

## 2019-06-28 DIAGNOSIS — E03.9 HYPOTHYROIDISM, UNSPECIFIED TYPE: Primary | ICD-10-CM

## 2019-06-28 DIAGNOSIS — Z87.42 HISTORY OF PCOS: ICD-10-CM

## 2019-06-28 DIAGNOSIS — E55.9 VITAMIN D DEFICIENCY: ICD-10-CM

## 2019-06-28 DIAGNOSIS — E66.9 OBESITY (BMI 35.0-39.9 WITHOUT COMORBIDITY): ICD-10-CM

## 2019-06-28 DIAGNOSIS — Z98.84 S/P BARIATRIC SURGERY: ICD-10-CM

## 2019-06-28 DIAGNOSIS — L60.3 BRITTLE NAILS: ICD-10-CM

## 2019-06-28 LAB
EST. AVERAGE GLUCOSE BLD GHB EST-MCNC: 111 MG/DL
HBA1C MFR BLD: 5.5 % (ref 4.2–5.6)
T4 FREE SERPL-MCNC: 1 NG/DL (ref 0.7–1.5)
TSH SERPL DL<=0.05 MIU/L-ACNC: 3.81 UIU/ML (ref 0.36–3.74)

## 2019-06-28 PROCEDURE — 83036 HEMOGLOBIN GLYCOSYLATED A1C: CPT

## 2019-06-28 PROCEDURE — 82306 VITAMIN D 25 HYDROXY: CPT

## 2019-06-28 PROCEDURE — 84443 ASSAY THYROID STIM HORMONE: CPT

## 2019-06-28 PROCEDURE — 84439 ASSAY OF FREE THYROXINE: CPT

## 2019-06-28 RX ORDER — SERTRALINE HYDROCHLORIDE 50 MG/1
TABLET, FILM COATED ORAL
COMMUNITY
End: 2019-06-28

## 2019-06-28 RX ORDER — SERTRALINE HYDROCHLORIDE 50 MG/1
50 TABLET, FILM COATED ORAL DAILY
Qty: 90 TAB | Refills: 3 | Status: SHIPPED | OUTPATIENT
Start: 2019-06-28 | End: 2020-02-13 | Stop reason: SDUPTHER

## 2019-06-28 NOTE — PROGRESS NOTES
Mark Quiroz is a 37 y.o.  female and presents with    Chief Complaint   Patient presents with    Follow-up     6 month follow up hypothyroidism, obesity. Subjective:  HPI   Mrs. Nevaeh Quarles presents today for a routine follow up but also to recheck thyroid function labs. Labs completed 2/19/19 noted TSH 6.36 and T4 normal and patient reported has been out of medication x 2 weeks of Synthroid 88 mcg. Refill medication at that time. cholesterol showed elevated  needed to be below 100, however HDL 58, ASCVD risk 0.7%, lifestyle modifications. Mrs. Nevaeh Quarles is  with 3 children. She is with a history of lap bad in 2010 and gastric bypass 2016 with Dr. 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 Gestational diabetes     She sees gynecologist is Melba Peralta, [address] [place] [address]. ablation performed. Last mammogram 2/2016 showed 4 mm left breast asymmetry, Diagnostic mammogram 4/2016 impression reported: In the superolateral quadrant of left breast at the junction of middle zone and deep zone that is small nodule, measuring up to 8 mm in diameter, with morphologic characteristics suggestive of a lymph node. This is likely to be benign finding. A follow-up digital diagnostic mammograms of left breast in 6 months, is recommended. This was not completed. She is with a history of lap bad in 2010 and gastric bypass 2016 with Dr. Yanique Jerome. She is taking B complex and Vitamin D. Instructed last visit to re-establish with Dr. Yanique Jerome. She is currently taking Tespo supplementation that contains minerals and vitamins for bariatric replacement, taking twice a day plus the Tespo womans vitamin/mineral replacement.      Her gynecologist was refilling her thyroid medication in the past. Labs completed 2/19/19 noted TSH 6.36 and T4 normal and patient reported has been out of medication x 2 weeks of Synthroid 88 mcg.  Refilled medication at that time with need for repeat labs in 8 weeks. cholesterol showed elevated  needed to be below 100, however HDL 58, ASCVD risk 0.7%, lifestyle modifications. She has been taking Zoloft 50 mg daily for hx of anxiety. But she is feeling that she would just like to be in bed all day. She reports brittle nails, hair more course. A1C 5.6 2/2019     Additional Concerns: none     ROS   Review of Systems   Cardiovascular: Positive for palpitations. Intermittent, started about 2 weeks ago, no assoc CP or sob   Psychiatric/Behavioral: Positive for depression. All other systems reviewed and are negative. Allergies   Allergen Reactions    Codeine Nausea and Vomiting and Other (comments)     dizziness       Current Outpatient Medications   Medication Sig Dispense Refill    OTHER Take  by mouth daily. Indications: tesbo vitamin for bariatric      sertraline (ZOLOFT) 50 mg tablet Take 1 Tab by mouth daily. 90 Tab 3    levothyroxine (SYNTHROID) 88 mcg tablet Take 1 Tab by mouth Daily (before breakfast). 30 Tab 5    cetirizine (ZYRTEC) 10 mg tablet TAKE 1 TABLET (10 MG TOTAL) BY MOUTH DAILY  2    cholecalciferol (VITAMIN D3) 1,000 unit cap Vitamin D3 1,000 unit capsule      cyanocobalamin (VITAMIN B-12) 1,000 mcg sublingual tablet Take 1,000 mcg by mouth daily.  b complex vitamins tablet Take 1 Tab by mouth daily.       fluticasone (FLONASE) 50 mcg/actuation nasal spray SPRAY 1 SPRAY INTO EACH NOSTRIL EVERY DAY  2       Social History     Socioeconomic History    Marital status:      Spouse name: Not on file    Number of children: Not on file    Years of education: Not on file    Highest education level: Not on file   Occupational History    Not on file   Social Needs    Financial resource strain: Not on file    Food insecurity:     Worry: Not on file     Inability: Not on file    Transportation needs:     Medical: Not on file     Non-medical: Not on file   Tobacco Use    Smoking status: Never Smoker    Smokeless tobacco: Never Used   Substance and Sexual Activity    Alcohol use:  Yes     Alcohol/week: 0.6 - 1.2 oz     Types: 1 - 2 Glasses of wine per week    Drug use: No    Sexual activity: Yes     Partners: Male   Lifestyle    Physical activity:     Days per week: Not on file     Minutes per session: Not on file    Stress: Not on file   Relationships    Social connections:     Talks on phone: Not on file     Gets together: Not on file     Attends Tenriism service: Not on file     Active member of club or organization: Not on file     Attends meetings of clubs or organizations: Not on file     Relationship status: Not on file    Intimate partner violence:     Fear of current or ex partner: Not on file     Emotionally abused: Not on file     Physically abused: Not on file     Forced sexual activity: Not on file   Other Topics Concern    Not on file   Social History Narrative    Not on file       Past Medical History:   Diagnosis Date    Anxiety     Arthritis     Depression     Gastric bypass status for obesity 2016    gestational diabetes     Hypothyroidism     [facility]     Morbid obesity with BMI of 40.0-44.9, adult ([facility]     Nausea & vomiting     PCOS (polycystic ovarian syndrome)     no medication as of 10/16    Psychiatric disorder     depression    Seasonal allergic rhinitis        Past Surgical History:   Procedure Laterality Date    HX GI  march 2010    lap band    HX GI  07/2016    removal of gastric band    HX GYN      dx laparoscopy    HX KNEE ARTHROSCOPY Left     x 3    LAP GASTRIC BYPASS/KRYS-EN-Y      11/15/2016       Family History   Problem Relation Age of Onset    Arthritis-osteo Mother     Asthma Mother     Heart Disease Father     Diabetes Father     No Known Problems Sister     Diabetes Maternal Grandfather        Objective:  Vitals:    06/28/19 0812   BP: 135/87   Pulse: 81   Resp: 14   Temp: 98.2 °F (36.8 °C)   TempSrc: Oral   SpO2: 98% Weight: 232 lb (105.2 kg)   Height: 5' 6\" (1.676 m)   PainSc:   0 - No pain       LABS   Results for orders placed or performed during the hospital encounter of 02/19/19   CBC WITH AUTOMATED DIFF   Result Value Ref Range    WBC 7.2 4.6 - 13.2 K/uL    RBC 4.99 4.20 - 5.30 M/uL    HGB 12.5 12.0 - 16.0 g/dL    HCT 39.6 35.0 - 45.0 %    MCV 79.4 74.0 - 97.0 FL    MCH 25.1 24.0 - 34.0 PG    MCHC 31.6 31.0 - 37.0 g/dL    RDW 15.0 (H) 11.6 - 14.5 %    PLATELET 781 107 - 429 K/uL    MPV 10.9 9.2 - 11.8 FL    NEUTROPHILS 61 40 - 73 %    LYMPHOCYTES 30 21 - 52 %    MONOCYTES 7 3 - 10 %    EOSINOPHILS 2 0 - 5 %    BASOPHILS 0 0 - 2 %    ABS. NEUTROPHILS 4.4 1.8 - 8.0 K/UL    ABS. LYMPHOCYTES 2.1 0.9 - 3.6 K/UL    ABS. MONOCYTES 0.5 0.05 - 1.2 K/UL    ABS. EOSINOPHILS 0.1 0.0 - 0.4 K/UL    ABS. BASOPHILS 0.0 0.0 - 0.1 K/UL    DF AUTOMATED     METABOLIC PANEL, COMPREHENSIVE   Result Value Ref Range    Sodium 142 136 - 145 mmol/L    Potassium 3.7 3.5 - 5.5 mmol/L    Chloride 106 100 - 108 mmol/L    CO2 30 21 - 32 mmol/L    Anion gap 6 3.0 - 18 mmol/L    Glucose 90 74 - 99 mg/dL    BUN 12 7.0 - 18 MG/DL    Creatinine 0.68 0.6 - 1.3 MG/DL    BUN/Creatinine ratio 18 12 - 20      GFR est AA >60 >60 ml/min/1.73m2    GFR est non-AA >60 >60 ml/min/1.73m2    Calcium 8.8 8.5 - 10.1 MG/DL    Bilirubin, total 0.5 0.2 - 1.0 MG/DL    ALT (SGPT) 16 13 - 56 U/L    AST (SGOT) 13 (L) 15 - 37 U/L    Alk.  phosphatase 84 45 - 117 U/L    Protein, total 7.0 6.4 - 8.2 g/dL    Albumin 3.7 3.4 - 5.0 g/dL    Globulin 3.3 2.0 - 4.0 g/dL    A-G Ratio 1.1 0.8 - 1.7     HEMOGLOBIN A1C WITH EAG   Result Value Ref Range    Hemoglobin A1c 5.6 4.2 - 5.6 %    Est. average glucose 114 mg/dL   LIPID PANEL   Result Value Ref Range    LIPID PROFILE          Cholesterol, total 237 (H) <200 MG/DL    Triglyceride 172 (H) <150 MG/DL    HDL Cholesterol 58 40 - 60 MG/DL    LDL, calculated 144.6 (H) 0 - 100 MG/DL    VLDL, calculated 34.4 MG/DL    CHOL/HDL Ratio 4.1 0 - 5.0 MICROALBUMIN, UR, RAND W/ MICROALB/CREAT RATIO   Result Value Ref Range    Microalbumin,urine random 1.62 0 - 3.0 MG/DL    Creatinine, urine 265.00 (H) 30 - 125 mg/dL    Microalbumin/Creat ratio (mg/g creat) 6 0 - 30 mg/g   T4, FREE   Result Value Ref Range    T4, Free 0.7 0.7 - 1.5 NG/DL   TSH 3RD GENERATION   Result Value Ref Range    TSH 6.36 (H) 0.36 - 3.74 uIU/mL   URINALYSIS W/ RFLX MICROSCOPIC   Result Value Ref Range    Color DARK YELLOW      Appearance CLOUDY      Specific gravity 1.023 1.005 - 1.030      pH (UA) 6.0 5.0 - 8.0      Protein NEGATIVE  NEG mg/dL    Glucose NEGATIVE  NEG mg/dL    Ketone TRACE (A) NEG mg/dL    Bilirubin NEGATIVE  NEG      Blood NEGATIVE  NEG      Urobilinogen 1.0 0.2 - 1.0 EU/dL    Nitrites NEGATIVE  NEG      Leukocyte Esterase TRACE (A) NEG     URINE MICROSCOPIC ONLY   Result Value Ref Range    WBC 1 to 3 0 - 4 /hpf    RBC NONE 0 - 5 /hpf    Epithelial cells 4+ 0 - 5 /lpf    Bacteria 1+ (A) NEG /hpf    Mucus 2+ (A) NEG /lpf    CA Oxalate crystals FEW (A) NEG         TESTS  none    PE  Physical Exam   Constitutional: She is oriented to person, place, and time. She appears well-developed and well-nourished. No distress. HENT:   Head: Normocephalic and atraumatic. Eyes: Pupils are equal, round, and reactive to light. Neck: Normal range of motion. Cardiovascular: Normal rate, regular rhythm, normal heart sounds and intact distal pulses. Pulmonary/Chest: Effort normal and breath sounds normal. No respiratory distress. She has no wheezes. She has no rales. She exhibits no tenderness. Abdominal: Soft. Bowel sounds are normal.   Musculoskeletal: Normal range of motion. Lymphadenopathy:     She has no cervical adenopathy. Neurological: She is alert and oriented to person, place, and time. Skin: Skin is warm and dry. She is not diaphoretic. Psychiatric: She has a normal mood and affect.  Her behavior is normal. Judgment and thought content normal.   Vitals reviewed. Assessment/Plan:    1. Anxiety, sleepiness, lack of engagement- Increase Zoloft to 75 mg x 2 weeks then increase to 100 mg if needed. Will check labs. She is to call me in two weeks and let me know how she is feeling. Also instructed to change to night time dosing- possible delay due to hx of gastric bypass causing sleepiness. 2. Hypothyroidism/PCOS- hair thinning, thinning nails,body hair growth, labs today. 3. HM- mammo overdue, Pap overdue- whitney with gyn. Lab review: orders written for new lab studies as appropriate; see orders    Today's Visit:   Diagnoses and all orders for this visit:    1. Hypothyroidism, unspecified type  -     TSH 3RD GENERATION; Future  -     T4, FREE; Future    2. Severe obesity ([address]    3. Abnormal facial hair  -     HEMOGLOBIN A1C WITH EAG; Future    4. Excess body and facial hair  -     HEMOGLOBIN A1C WITH EAG; Future    5. History of PCOS  -     HEMOGLOBIN A1C WITH EAG; Future    6. S/P bariatric surgery  -     HEMOGLOBIN A1C WITH EAG; Future  -     VITAMIN D, 25 HYDROXY; Future    7. Obesity (BMI 35.0-39.9 without comorbidity)  -     HEMOGLOBIN A1C WITH EAG; Future    8. Hair loss  -     VITAMIN D, 25 HYDROXY; Future    9. Brittle nails  -     VITAMIN D, 25 HYDROXY; Future    10. Vitamin D deficiency  -     VITAMIN D, 25 HYDROXY; Future    Other orders  -     sertraline (ZOLOFT) 50 mg tablet; Take 1 Tab by mouth daily. Health Maintenance: mammo and pap overdue    I have discussed the diagnosis with the patient and the intended plan as seen in the above orders. The patient has received an after-visit summary and questions were answered concerning future plans. I have discussed medication side effects and warnings with the patient as well. I have reviewed the plan of care with the patient, accepted their input and they are in agreement with the treatment goals.           More than 1/2 of this 15 minute visit was spent in counseling and coordination of care, as described above.     ALLAN Philippe  Internist of [address] [facility], [facility] [address]  Phone: [phone]  Fax: [phone]

## 2019-06-28 NOTE — PROGRESS NOTES
Suzanne Alejandro presents today for   Chief Complaint   Patient presents with    Follow-up     6 month follow up hypothyroidism, obesity. Depression Screening:  3 most recent PHQ Screens 6/28/2019   Little interest or pleasure in doing things Several days   Feeling down, depressed, irritable, or hopeless Several days   Total Score PHQ 2 2       Learning Assessment:  Learning Assessment 6/28/2019   PRIMARY LEARNER Patient   HIGHEST LEVEL OF EDUCATION - PRIMARY LEARNER  4 YEARS OF COLLEGE   BARRIERS PRIMARY LEARNER NONE     NONE   PRIMARY LANGUAGE ENGLISH   LEARNER PREFERENCE PRIMARY VIDEOS   ANSWERED BY patient   RELATIONSHIP SELF       Abuse Screening:  Abuse Screening Questionnaire 6/28/2019   Do you ever feel afraid of your partner? N   Are you in a relationship with someone who physically or mentally threatens you? N   Is it safe for you to go home? Y       Fall Risk  No flowsheet data found. Coordination of Care:  1. Have you been to the ER, urgent care clinic since your last visit? Hospitalized since your last visit? no    2. Have you seen or consulted any other health care providers outside of the [address] [place] [address] since your last visit? Include any pap smears or colon screening.  no

## 2019-06-29 LAB — 25(OH)D3 SERPL-MCNC: 23.1 NG/ML (ref 30–100)

## 2019-07-02 DIAGNOSIS — E55.9 VITAMIN D DEFICIENCY: ICD-10-CM

## 2019-07-02 DIAGNOSIS — E03.9 HYPOTHYROIDISM, UNSPECIFIED TYPE: Primary | ICD-10-CM

## 2019-07-02 RX ORDER — ACETAMINOPHEN 500 MG
2000 TABLET ORAL 2 TIMES DAILY
Qty: 90 CAP | Refills: 3 | Status: SHIPPED | OUTPATIENT
Start: 2019-07-02

## 2019-07-02 RX ORDER — LEVOTHYROXINE SODIUM 100 UG/1
100 TABLET ORAL
Qty: 90 TAB | Refills: 1 | Status: SHIPPED | OUTPATIENT
Start: 2019-07-02 | End: 2019-12-18 | Stop reason: SDUPTHER

## 2019-07-03 ENCOUNTER — TELEPHONE (OUTPATIENT)
Dept: INTERNAL MEDICINE CLINIC | Age: 44
End: 2019-07-03

## 2019-07-03 NOTE — LETTER
7/11/2019 8:56 AM 
 
Ms. Arun Ladd [address] [address] We have tried to reach you by phone and have been unsuccessful. Martita Redmond NP has the following message for you:  
Vitamin D 23.1- need daily OTC Vitamin D 2000 units daily, TSH 3.81 Increase Levo to 100 mcg daily 
  
 
 
 
 
Sincerely, Emerald Pang NP

## 2019-07-03 NOTE — TELEPHONE ENCOUNTER
----- Message from Izabella Tariq NP sent at 7/2/2019  9:07 PM EDT -----  Vitamin D 23.1- need daily OTC Vitamin D 2000 units daily, TSH 3.81 Increase Levo to 100 mcg daily

## 2019-10-18 ENCOUNTER — DOCUMENTATION ONLY (OUTPATIENT)
Dept: SURGERY | Age: 44
End: 2019-10-18

## 2019-10-18 NOTE — LETTER
[facility] 
 
 
Dear Patient, Your health is our main concern. It is important for your health to have follow-up lab work and to see you surgeon at 2 months, 4 months, 6 months, 9 months and annually after your weight loss surgery. Additionally, the Department of Bariatric Surgery at our hospital is a member of the [employer] [address] Surgical Quality Improvement Program ([facility] NSQIP). As a participant in this program, we gather information on the outcomes of our patients after surgery. Please call the office for a follow up appointment at [phone]. If you have moved out of the area or have changed surgeons please call us and let us know the name of your doctor. Your health and feedback are important to us. We greatly appreciate your response. Thank you, [facility] [address] [place] [address],-1

## 2019-10-18 NOTE — PROGRESS NOTES
Per [facility] requirements;  E-mail and letter sent for follow up appointment. [employer] [facility] [place]  [employer] [facility]  [facility]      Dear Patient,    Your health is our main concern. It is important for your health to have follow-up lab work and to see you surgeon at 2 months, 4 months, 6 months, 9 months and annually after your weight loss surgery. Additionally, the Department of Bariatric Surgery at our hospital is a member of the [address] Surgical Quality Improvement Program ([facility] NSQIP). As a participant in this program, we gather information on the outcomes of our patients after surgery. Please call the office for a follow up appointment at [phone]. If you have moved out of the area or have changed surgeons please call us and let us know the name of your doctor. Your health and feedback are important to us. We greatly appreciate your response.        Thank you,  [employer] [facility]

## 2019-10-31 LAB
MAMMOGRAPHY, EXTERNAL: NORMAL
PAP SMEAR, EXTERNAL: NORMAL

## 2019-12-18 DIAGNOSIS — E03.9 HYPOTHYROIDISM, UNSPECIFIED TYPE: ICD-10-CM

## 2019-12-18 RX ORDER — LEVOTHYROXINE SODIUM 100 UG/1
100 TABLET ORAL
Qty: 90 TAB | Refills: 1 | Status: SHIPPED | OUTPATIENT
Start: 2019-12-18 | End: 2020-06-15

## 2019-12-18 NOTE — TELEPHONE ENCOUNTER
Last Visit: 6/28/19 with SKINNY Manzano  Next Appointment: 6/29/20 with MD Nilsa Ulloa  Previous Refill Encounter(s): 7/2/19 #90 with 1 refill    Requested Prescriptions     Pending Prescriptions Disp Refills    levothyroxine (SYNTHROID) 100 mcg tablet 90 Tab 1     Sig: Take 1 Tab by mouth Daily (before breakfast).

## 2020-01-10 ENCOUNTER — HOSPITAL ENCOUNTER (OUTPATIENT)
Dept: MAMMOGRAPHY | Age: 45
Discharge: HOME | End: 2020-01-10
Attending: OBSTETRICS & GYNECOLOGY
Payer: COMMERCIAL

## 2020-01-10 DIAGNOSIS — Z12.31 VISIT FOR SCREENING MAMMOGRAM: ICD-10-CM

## 2020-01-10 PROCEDURE — 77063 BREAST TOMOSYNTHESIS BI: CPT

## 2020-01-30 RX ORDER — SERTRALINE HYDROCHLORIDE 50 MG/1
50 TABLET, FILM COATED ORAL DAILY
Qty: 90 TAB | Refills: 3 | Status: CANCELLED | OUTPATIENT
Start: 2020-01-30

## 2020-01-30 NOTE — TELEPHONE ENCOUNTER
Patient is requesting a dose increase on her Zoloft. Please advise and pend new Rx if appropriate. Notes from last OV:  Assessment/Plan:    1. Anxiety, sleepiness, lack of engagement- Increase Zoloft to 75 mg x 2 weeks then increase to 100 mg if needed. Will check labs. She is to call me in two weeks and let me know how she is feeling. Also instructed to change to night time dosing- possible delay due to hx of gastric bypass causing sleepiness.     Last visit:  6/28/19 with SKINNY Bliss  Next appt:  6/29/20 with MD ViClone

## 2020-01-30 NOTE — TELEPHONE ENCOUNTER
Pt calling, says she needs refill of her zoloft. Says at last visit [facility] had advised she could take 1.5 pills daily so now she is running out early. Says she needs new rx with the corrected directions.

## 2020-01-30 NOTE — LETTER
2/12/2020 9:23 AM 
 
Ms. Brianna Umanzor [address] [facility] [address] Dear Ms. Colton Thomas, We have been unable to reach you by phone.  
 
Please call our office at [phone] and ask to speak with my nurse to discuss your medication request. 
 
Sincerely, 
 
 
Amos Temple MD

## 2020-01-30 NOTE — TELEPHONE ENCOUNTER
What dose is she taking now?     Dr. Roshan Powers  Internists of [facility], [address] [facility], [address]  Phone: [phone]  Fax: [phone]

## 2020-01-31 NOTE — TELEPHONE ENCOUNTER
LVM for patient to return our call with what dosage of zoloft and how many times per she is taking it.

## 2020-02-12 ENCOUNTER — TELEPHONE (OUTPATIENT)
Dept: INTERNAL MEDICINE CLINIC | Age: 45
End: 2020-02-12

## 2020-02-12 NOTE — TELEPHONE ENCOUNTER
Please schedule her to see me sooner. First available apt is ok.     Dr. Alanis Lara  Internists of [facility], [address] [facility], [address]  Phone: [phone]  Fax: [phone]

## 2020-02-12 NOTE — TELEPHONE ENCOUNTER
Pt calling asking if Dr. Jina Barba can fit her in soon for appt. Says she has been feeling fatigued and dizzy. She is presently set for appt in June. Advised Dr. Audrey Law of office today. Please advise.

## 2020-02-13 ENCOUNTER — OFFICE VISIT (OUTPATIENT)
Dept: INTERNAL MEDICINE CLINIC | Age: 45
End: 2020-02-13

## 2020-02-13 VITALS
DIASTOLIC BLOOD PRESSURE: 88 MMHG | TEMPERATURE: 97.7 F | SYSTOLIC BLOOD PRESSURE: 129 MMHG | HEIGHT: 66 IN | WEIGHT: 249 LBS | BODY MASS INDEX: 40.02 KG/M2 | RESPIRATION RATE: 16 BRPM | OXYGEN SATURATION: 98 % | HEART RATE: 83 BPM

## 2020-02-13 DIAGNOSIS — E66.01 SEVERE OBESITY (HCC): ICD-10-CM

## 2020-02-13 DIAGNOSIS — J30.9 ALLERGIC RHINITIS, UNSPECIFIED SEASONALITY, UNSPECIFIED TRIGGER: ICD-10-CM

## 2020-02-13 DIAGNOSIS — K90.9 INTESTINAL MALABSORPTION, UNSPECIFIED TYPE: ICD-10-CM

## 2020-02-13 DIAGNOSIS — Z98.84 HISTORY OF BARIATRIC SURGERY: ICD-10-CM

## 2020-02-13 DIAGNOSIS — E03.9 HYPOTHYROIDISM, UNSPECIFIED TYPE: Primary | ICD-10-CM

## 2020-02-13 DIAGNOSIS — F32.A DEPRESSIVE DISORDER: ICD-10-CM

## 2020-02-13 DIAGNOSIS — E55.9 VITAMIN D DEFICIENCY: ICD-10-CM

## 2020-02-13 DIAGNOSIS — R42 DIZZINESS: ICD-10-CM

## 2020-02-13 DIAGNOSIS — E28.2 PCOS (POLYCYSTIC OVARIAN SYNDROME): ICD-10-CM

## 2020-02-13 DIAGNOSIS — F41.9 ANXIETY: ICD-10-CM

## 2020-02-13 RX ORDER — SERTRALINE HYDROCHLORIDE 25 MG/1
25 TABLET, FILM COATED ORAL DAILY
Qty: 90 TAB | Refills: 3 | Status: SHIPPED | OUTPATIENT
Start: 2020-02-13 | End: 2022-03-11

## 2020-02-13 RX ORDER — SERTRALINE HYDROCHLORIDE 50 MG/1
50 TABLET, FILM COATED ORAL DAILY
Qty: 90 TAB | Refills: 3 | Status: SHIPPED | OUTPATIENT
Start: 2020-02-13 | End: 2020-09-13

## 2020-02-13 RX ORDER — FLUTICASONE PROPIONATE 50 MCG
2 SPRAY, SUSPENSION (ML) NASAL DAILY
Qty: 1 BOTTLE | Refills: 5 | Status: SHIPPED | OUTPATIENT
Start: 2020-02-13 | End: 2020-08-24

## 2020-02-13 NOTE — PROGRESS NOTES
Brianna Umanzor presents today for   Chief Complaint   Patient presents with    Fatigue    Weight Gain    Medication Evaluation     Patient states that she was taking zoloft 50 mg 1.5 tabs daily.  Dizziness     intermittently. Depression Screening:  3 most recent PHQ Screens 2/13/2020   Little interest or pleasure in doing things Several days   Feeling down, depressed, irritable, or hopeless Several days   Total Score PHQ 2 2       Learning Assessment:  Learning Assessment 6/28/2019   PRIMARY LEARNER Patient   HIGHEST LEVEL OF EDUCATION - PRIMARY LEARNER  4 YEARS OF COLLEGE   BARRIERS PRIMARY LEARNER NONE     NONE   PRIMARY LANGUAGE ENGLISH   LEARNER PREFERENCE PRIMARY VIDEOS   ANSWERED BY patient   RELATIONSHIP SELF       Abuse Screening:  Abuse Screening Questionnaire 2/13/2020   Do you ever feel afraid of your partner? N   Are you in a relationship with someone who physically or mentally threatens you? N   Is it safe for you to go home? Y       Fall Risk  Fall Risk Assessment, last 12 mths 2/13/2020   Able to walk? Yes   Fall in past 12 months? No           Coordination of Care:  1. Have you been to the ER, urgent care clinic since your last visit? Hospitalized since your last visit? no    2. Have you seen or consulted any other health care providers outside of the [address] [place] [address] since your last visit? Include any pap smears or colon screening. no      Advance Directive:  1. Do you have an advance directive in place? Patient Reply:no    2. If not, would you like material regarding how to put one in place?  Patient Reply: no

## 2020-02-13 NOTE — PATIENT INSTRUCTIONS
Body Mass Index: Care Instructions  Your Care Instructions    Body mass index (BMI) can help you see if your weight is raising your risk for health problems. It uses a formula to compare how much you weigh with how tall you are. · A BMI lower than 18.5 is considered underweight. · A BMI between 18.5 and 24.9 is considered healthy. · A BMI between 25 and 29.9 is considered overweight. A BMI of 30 or higher is considered obese. If your BMI is in the normal range, it means that you have a lower risk for weight-related health problems. If your BMI is in the overweight or obese range, you may be at increased risk for weight-related health problems, such as high blood pressure, heart disease, stroke, arthritis or joint pain, and diabetes. If your BMI is in the underweight range, you may be at increased risk for health problems such as fatigue, lower protection (immunity) against illness, muscle loss, bone loss, hair loss, and hormone problems. BMI is just one measure of your risk for weight-related health problems. You may be at higher risk for health problems if you are not active, you eat an unhealthy diet, or you drink too much alcohol or use tobacco products. Follow-up care is a key part of your treatment and safety. Be sure to make and go to all appointments, and call your doctor if you are having problems. It's also a good idea to know your test results and keep a list of the medicines you take. How can you care for yourself at home? · Practice healthy eating habits. This includes eating plenty of fruits, vegetables, whole grains, lean protein, and low-fat dairy. · If your doctor recommends it, get more exercise. Walking is a good choice. Bit by bit, increase the amount you walk every day. Try for at least 30 minutes on most days of the week. · Do not smoke. Smoking can increase your risk for health problems. If you need help quitting, talk to your doctor about stop-smoking programs and medicines. These can increase your chances of quitting for good. · Limit alcohol to 2 drinks a day for men and 1 drink a day for women. Too much alcohol can cause health problems. If you have a BMI higher than 25  · Your doctor may do other tests to check your risk for weight-related health problems. This may include measuring the distance around your waist. A waist measurement of more than 40 inches in men or 35 inches in women can increase the risk of weight-related health problems. · Talk with your doctor about steps you can take to stay healthy or improve your health. You may need to make lifestyle changes to lose weight and stay healthy, such as changing your diet and getting regular exercise. If you have a BMI lower than 18.5  · Your doctor may do other tests to check your risk for health problems. · Talk with your doctor about steps you can take to stay healthy or improve your health. You may need to make lifestyle changes to gain or maintain weight and stay healthy, such as getting more healthy foods in your diet and doing exercises to build muscle. Where can you learn more? Go to http://jenna-mary.info/. Enter S176 in the search box to learn more about \"Body Mass Index: Care Instructions. \"  Current as of: March 28, 2019  Content Version: 12.2  © 2960-7958 [employer]. Care instructions adapted under license by [employer] (which disclaims liability or warranty for this information). If you have questions about a medical condition or this instruction, always ask your healthcare professional. [address] any warranty or liability for your use of this information.

## 2020-02-13 NOTE — PROGRESS NOTES
[facility]NISTS OF [facility]:  2/13/2020, MRN: [id number]      Elliot Balderas is a 53 y.o. female and presents to clinic for Fatigue; Weight Gain; Medication Evaluation (Patient states that she was taking zoloft 50 mg 1.5 tabs daily. ); and Dizziness (intermittently. )    Subjective: The pt is a 49yo female with h/o CHRISTIAN, PUD, vitamin D Deficiency, allergic rhinitis, depression/anxiety, lap band surgery, gastric bypass surgery, obesity, OA, PCOS, and hypothyroidism. 1. Weight Gain/Obesity and Hypothyroidism: Diet: \"I follow the Bariatric guidelines 80% of the time. \" She sneaks in a snack a couple of times per wk  Weight is 249lbs. Exercise: she walks for 1 hour. H/o lap band surgery and gastric bypass surgery. Her last LDL in 2019 measured in the 140s range. Her A1C was normal. She has gained 20lbs over 4 months. No changes have occurred with her diet. She also has a history of hypothyroidism and takes 100 mcg daily of Synthroid. Her last TFTs were our system show that her free T4 was normal but her TSH was elevated at 3.81. +PCOS. 2. Depression/Anxiety: She's been off 75mg daily of sertraline after running out of this rx. She's been on sertraline for \"a long time\" - 11-12 yrs. Hospitalizations for mood sx: none. Psychiatry: none. SI hx: none. She is taking it more for anxiety vs depression. She ran out of rx 2 wks ago. She has been \"a mess\" since stopping the rx. 3. PUD: +Fatigue: +PUD hx. SOB: none. Bleeding: none. Taking vitamins: yes given her history of gastric bypass surgery. 4. CHRISTIAN: ETOH intake: minor - a glass or two every 2-3 wks. weight is 249 pounds. 5. H/o Vitamin D Deficiency: Taking vitamin D and B complex vitamin. 6. Dizziness: Present x 4 days. She will have 10 episodes per day. No triggers are known. She has never felt like this before. \"I feel like I can pass out. \" No CP, SOB, palpitations, or hearing loss. She denies vertigo. Dizzy spells lasts 15 seconds at a time. No alleviating factors are known. No HAs, vision changes, and hearing loss. She has some tinnitus off/on b/l. Patient Active Problem List    Diagnosis Date Noted    Severe obesity (RUST 75.) 06/28/2019    Depressive disorder 12/26/2018    S/P bariatric surgery 11/30/2016    Intestinal malabsorption 11/30/2016    Arthritis     Anxiety     Hypothyroidism 03/24/2012    PCOS (polycystic ovarian syndrome) 03/24/2012       Current Outpatient Medications   Medication Sig Dispense Refill    levothyroxine (SYNTHROID) 100 mcg tablet Take 1 Tab by mouth Daily (before breakfast). 90 Tab 1    cholecalciferol (VITAMIN D3) 2,000 unit cap capsule Take 2,000 Units by mouth two (2) times a day. 90 Cap 3    OTHER Take  by mouth daily. Indications: tesbo vitamin for bariatric      sertraline (ZOLOFT) 50 mg tablet Take 1 Tab by mouth daily. (Patient taking differently: Take 75 mg by mouth daily.) 90 Tab 3    cetirizine (ZYRTEC) 10 mg tablet TAKE 1 TABLET (10 MG TOTAL) BY MOUTH DAILY  2    fluticasone (FLONASE) 50 mcg/actuation nasal spray SPRAY 1 SPRAY INTO EACH NOSTRIL EVERY DAY  2    cyanocobalamin (VITAMIN B-12) 1,000 mcg sublingual tablet Take 1,000 mcg by mouth daily.  b complex vitamins tablet Take 1 Tab by mouth daily.          Allergies   Allergen Reactions    Codeine Nausea and Vomiting and Other (comments)     dizziness       Past Medical History:   Diagnosis Date    Anxiety     Arthritis     Depression     Gastric bypass status for obesity 2016    gestational diabetes     Hypothyroidism     Morbid obesity (RUST 75.)     Morbid obesity with BMI of 40.0-44.9, adult (HCC)     Nausea & vomiting     PCOS (polycystic ovarian syndrome)     no medication as of 10/16    Psychiatric disorder     depression    Seasonal allergic rhinitis        Past Surgical History:   Procedure Laterality Date    HX GI  march 2010    lap band    HX GI  07/2016    removal of gastric band    HX GYN      dx laparoscopy  HX KNEE ARTHROSCOPY Left     x 3    LAP GASTRIC BYPASS/KRYS-EN-Y      11/15/2016       Family History   Problem Relation Age of Onset    Arthritis-osteo Mother     Asthma Mother     Heart Disease Father     Diabetes Father     No Known Problems Sister     Diabetes Maternal Grandfather        Social History     Tobacco Use    Smoking status: Never Smoker    Smokeless tobacco: Never Used   Substance Use Topics    Alcohol use: Yes     Alcohol/week: 1.0 - 2.0 standard drinks     Types: 1 - 2 Glasses of wine per week       ROS   Review of Systems   Constitutional: Positive for malaise/fatigue. Negative for chills, fever and weight loss. HENT: Negative for ear pain and sore throat. Eyes: Negative for blurred vision and pain. Respiratory: Negative for cough and shortness of breath. Cardiovascular: Negative for chest pain. Gastrointestinal: Positive for blood in stool. Negative for abdominal pain and melena. Genitourinary: Negative for dysuria and hematuria. Musculoskeletal: Positive for joint pain (b/l knee pain). Negative for myalgias. Skin: Negative for rash. Neurological: Positive for dizziness. Negative for tingling, focal weakness and headaches. Endo/Heme/Allergies: Positive for environmental allergies. Does not bruise/bleed easily. Psychiatric/Behavioral: Positive for depression. Negative for substance abuse and suicidal ideas. The patient is nervous/anxious. Objective     Vitals:    02/13/20 1445 02/13/20 1447   BP: (!) 128/92 129/88   Pulse: 83    Resp: 16    Temp: 97.7 °F (36.5 °C)    TempSrc: Oral    SpO2: 98%    Weight: 249 lb (112.9 kg)    Height: 5' 6\" (1.676 m)    PainSc:   3    PainLoc: Knee        Physical Exam  Vitals signs and nursing note reviewed. HENT:      Head: Normocephalic and atraumatic.       Right Ear: Tympanic membrane and external ear normal.      Left Ear: Tympanic membrane and external ear normal.      Nose: Nose normal.      Mouth/Throat: Pharynx: No oropharyngeal exudate. Eyes:      General: No scleral icterus. Right eye: No discharge. Left eye: No discharge. Conjunctiva/sclera: Conjunctivae normal.   Neck:      Musculoskeletal: Neck supple. Cardiovascular:      Rate and Rhythm: Normal rate and regular rhythm. Heart sounds: Normal heart sounds. No murmur. No friction rub. No gallop. Pulmonary:      Effort: Pulmonary effort is normal. No respiratory distress. Breath sounds: Normal breath sounds. No wheezing or rales. Abdominal:      General: Bowel sounds are normal. There is no distension. Palpations: Abdomen is soft. There is no mass. Tenderness: There is no abdominal tenderness. There is no guarding or rebound. Musculoskeletal:         General: No swelling (Bue) or tenderness (Bue). Lymphadenopathy:      Cervical: No cervical adenopathy. Skin:     General: Skin is warm and dry. Findings: No erythema. Neurological:      Mental Status: She is alert and oriented to person, place, and time. Motor: No abnormal muscle tone. Gait: Gait is intact.  Gait normal.   Psychiatric:         Mood and Affect: Mood and affect normal.         LABS   Data Review:   Lab Results   Component Value Date/Time    WBC 7.2 02/19/2019 09:03 AM    HGB 12.5 02/19/2019 09:03 AM    HCT 39.6 02/19/2019 09:03 AM    PLATELET 383 31/14/4200 09:03 AM    MCV 79.4 02/19/2019 09:03 AM    Hgb, External 14.1 03/06/2013    Hct, External 36.5 03/06/2013    Platelet cnt., External 260K 03/06/2013       Lab Results   Component Value Date/Time    Sodium 142 02/19/2019 09:03 AM    Potassium 3.7 02/19/2019 09:03 AM    Chloride 106 02/19/2019 09:03 AM    CO2 30 02/19/2019 09:03 AM    Anion gap 6 02/19/2019 09:03 AM    Glucose 90 02/19/2019 09:03 AM    BUN 12 02/19/2019 09:03 AM    Creatinine 0.68 02/19/2019 09:03 AM    BUN/Creatinine ratio 18 02/19/2019 09:03 AM    GFR est AA >60 02/19/2019 09:03 AM    GFR est non-AA >60 02/19/2019 09:03 AM    Calcium 8.8 02/19/2019 09:03 AM       Lab Results   Component Value Date/Time    Cholesterol, total 237 (H) 02/19/2019 09:03 AM    HDL Cholesterol 58 02/19/2019 09:03 AM    LDL, calculated 144.6 (H) 02/19/2019 09:03 AM    VLDL, calculated 34.4 02/19/2019 09:03 AM    Triglyceride 172 (H) 02/19/2019 09:03 AM    CHOL/HDL Ratio 4.1 02/19/2019 09:03 AM       Lab Results   Component Value Date/Time    Hemoglobin A1c 5.5 06/28/2019 08:18 AM       Assessment/Plan:   1. Hypothyroidism: Stable. +Having fatigue. +H/o PCOS. +Weight Gain. +Intestinal malabsorption secondary to bariatric surgery history. +CHRISTIAN. -Continue with Rx as prescribed.  -Checking labs. -I encouraged her to reduce her processed food and caloric intake. I encouraged her to maintain a bariatric diet in order to reduce her weight.  -Referral placed to a nutritionist of the patient's choosing.  -We will check vitamin levels. -Continue with vitamin supplementation given her history of bariatric surgery. ORDERS:  - TSH AND FREE T4; Future  - LIPID PANEL; Future  - CBC WITH AUTOMATED DIFF; Future  - METABOLIC PANEL, COMPREHENSIVE; Future  - VITAMIN B12; Future  - FOLATE; Future  - VITAMIN D, 25 HYDROXY; Future  - REFERRAL TO NUTRITION    2. Anxiety/Depression:   - Refilling her sertraline for 75 mg daily. ORDERS:  - sertraline (ZOLOFT) 50 mg tablet; Take 1 Tab by mouth daily. Take with a 25mg sertraline tab for a total daily dose of 75mg  Dispense: 90 Tab; Refill: 3  - sertraline (ZOLOFT) 25 mg tablet; Take 1 Tab by mouth daily. Take with a 50mg sertraline tab for a total daily dose of 75mg  Dispense: 90 Tab; Refill: 3    3. Allergic rhinitis:   -Ordering Flonase. ORDERS:  - fluticasone propionate (FLONASE) 50 mcg/actuation nasal spray; 2 Sprays by Both Nostrils route daily. Dispense: 1 Bottle; Refill: 5    4. Dizziness: PE findings are reassuring.  -We will check labs as mentioned above.   - An EKG was done today and findings are reassuring.  - Return to clinic if symptoms continue at which time we would consider a Holter study/echocardiogram.    ORDERS:  - AMB POC EKG ROUTINE W/ 12 LEADS, INTER & REP    5. PUD:  - Checking labs as mentioned above. Observation for now. There are no preventive care reminders to display for this patient. Lab review: labs are reviewed in the EHR and ordered as mentioned above. I have discussed the diagnosis with the patient and the intended plan as seen in the above orders. The patient has received an after-visit summary and questions were answered concerning future plans. I have discussed medication side effects and warnings with the patient as well. I have reviewed the plan of care with the patient, accepted their input and they are in agreement with the treatment goals. All questions were answered. The patient understands the plan of care. Handouts provided today with above information. Pt instructed if symptoms worsen to call the office or report to the ED for continued care. Greater than 50% of the visit time was spent in counseling and/or coordination of care. Voice recognition was used to generate this report, which may have resulted in some phonetic based errors in grammar and contents. Even though attempts were made to correct all the mistakes, some may have been missed, and remained in the body of the document.           Tiara Og MD

## 2020-02-23 PROBLEM — E55.9 VITAMIN D DEFICIENCY: Status: ACTIVE | Noted: 2020-02-23

## 2020-02-23 PROBLEM — K27.9 PUD (PEPTIC ULCER DISEASE): Status: ACTIVE | Noted: 2020-02-23

## 2020-02-23 PROBLEM — J30.9 ALLERGIC RHINITIS: Status: ACTIVE | Noted: 2020-02-23

## 2020-02-23 PROBLEM — K75.81 NASH (NONALCOHOLIC STEATOHEPATITIS): Status: ACTIVE | Noted: 2020-02-23

## 2020-03-26 ENCOUNTER — APPOINTMENT (OUTPATIENT)
Dept: NUTRITION | Age: 45
End: 2020-03-26

## 2020-06-15 DIAGNOSIS — E03.9 HYPOTHYROIDISM, UNSPECIFIED TYPE: ICD-10-CM

## 2020-06-15 RX ORDER — LEVOTHYROXINE SODIUM 100 UG/1
TABLET ORAL
Qty: 90 TAB | Refills: 1 | Status: SHIPPED | OUTPATIENT
Start: 2020-06-15 | End: 2021-03-08

## 2020-06-25 ENCOUNTER — APPOINTMENT (OUTPATIENT)
Dept: NUTRITION | Age: 45
End: 2020-06-25

## 2020-06-30 ENCOUNTER — HOSPITAL ENCOUNTER (OUTPATIENT)
Dept: NUTRITION | Age: 45
Discharge: HOME | End: 2020-06-30
Payer: COMMERCIAL

## 2020-06-30 PROCEDURE — 97802 MEDICAL NUTRITION INDIV IN: CPT

## 2020-06-30 NOTE — PROGRESS NOTES
[address] [place] [address], [place], [address] [place] [address] Road  Phone: [phone] Fax: [phone]   Nutrition Assessment - Medical Nutrition Therapy   Outpatient Initial Evaluation         Patient Name: Hema Forte : 1975   Treatment Diagnosis: Obesity   Referral Source: Myles García MD [facility]): 2020     Gender: female Age: 53 y.o. Ht: 67 in Wt:  249.2 lb  kg   BMI: 53.9 BMR   Male  BMR Female      Past Medical History:  Gastric sleeve with revision to gastric bypass (2017)     Pertinent Medications:   Vitamins, see MAR     Biochemical Data:   Lab Results   Component Value Date/Time    Hemoglobin A1c 5.5 2019 08:18 AM     Lab Results   Component Value Date/Time    Sodium 142 2019 09:03 AM    Potassium 3.7 2019 09:03 AM    Chloride 106 2019 09:03 AM    CO2 30 2019 09:03 AM    Anion gap 6 2019 09:03 AM    Glucose 90 2019 09:03 AM    BUN 12 2019 09:03 AM    Creatinine 0.68 2019 09:03 AM    BUN/Creatinine ratio 18 2019 09:03 AM    GFR est AA >60 2019 09:03 AM    GFR est non-AA >60 2019 09:03 AM    Calcium 8.8 2019 09:03 AM    Bilirubin, total 0.5 2019 09:03 AM    Alk.  phosphatase 84 2019 09:03 AM    Protein, total 7.0 2019 09:03 AM    Albumin 3.7 2019 09:03 AM    Globulin 3.3 2019 09:03 AM    A-G Ratio 1.1 2019 09:03 AM    ALT (SGPT) 16 2019 09:03 AM    AST (SGOT) 13 (L) 2019 09:03 AM     Lab Results   Component Value Date/Time    Cholesterol, total 237 (H) 2019 09:03 AM    HDL Cholesterol 58 2019 09:03 AM    LDL, calculated 144.6 (H) 2019 09:03 AM    VLDL, calculated 34.4 2019 09:03 AM    Triglyceride 172 (H) 2019 09:03 AM    CHOL/HDL Ratio 4.1 2019 09:03 AM     Lab Results   Component Value Date/Time    ALT (SGPT) 16 2019 09:03 AM    AST (SGOT) 13 (L) 2019 09:03 AM Alk. phosphatase 84 02/19/2019 09:03 AM    Bilirubin, total 0.5 02/19/2019 09:03 AM     Lab Results   Component Value Date/Time    Creatinine 0.68 02/19/2019 09:03 AM     Lab Results   Component Value Date/Time    BUN 12 02/19/2019 09:03 AM     Lab Results   Component Value Date/Time    Microalbumin/Creat ratio (mg/g creat) 6 02/19/2019 09:03 AM    Microalbumin,urine random 1.62 02/19/2019 09:03 AM        Assessment:    Pt is here to seek help in getting back on track and applying structure to her lifestyle. She has had weight regain since her last surgery and can't seem to keep it off. She works full-time and lives with her  and 3 children. She admits she has not been following a prescribed diet and she does sabotage herself when she begins having success. She does not currently exercise, but is willing to. After reviewing her current lifestyle and habits it is clear she needs concrete structure; mealtimes, tracking intake, meal planning/prepping ahead. Pt is motivated to make a change. Will follow up in one month. Food & Nutrition:        Estimate Needs   Calories: 1200  Protein: 105 Carbs: 75 Fat: 53   Kcal/day  g/day  g/day  g/day        percent: 35  25  40               Nutrition Diagnosis Obesity related to excess energy intake and physical inactivity as evidenced by BMI >30, depression, pt reports over-consumption of high-fat/high calorie food/beverages, pt reports large portions, and sub-optimal food choices/patterns. Nutrition Intervention &  Education: Educated pt on the pathophysiology of nutrient absorption and digestion s/p gastric bypass as well as body type eating styles. Educated pt on lean proteins, healthy fats, non-starchy vegetables, and complex carbohydrate food sources. Discussed limiting carbohydrates, label reading, meal timing, and appropriate serving sizes. Encouraged pt to avoid sugary beverages.  Reviewed meal builder tool, calorie and macro breakdown as well as exercise parameters. 1.Meal Plan: Assures nutritional adequacy  Structured so as to consistently promote energy deficit  Sufficiently satisfying so that the patient can maintain the meal plan and make it fit into his or her lifestyle; .  2. Physical activity of moderate intensity for 30 to 60 minutes daily:  Planned activities (walking, swimming, cycling, jogging, etc)  Lifestyle changes (change environment to use more energy by walking, taking stairs, reduce sedentary time by watching less television, etc.) Many overweight or obese individuals will need to begin low-intensity activities for only 5 to 10 minutes per session; however, goals should include increasing frequency, intensity, and time. 3. Behavior change: Identify and interrupt cues to inappropriate eating behaviors (environmental and emotional). Build a network of support for the patient. Integrate meal plan and physical activity into lifestyle so as to maintain weight loss.    Handouts Provided: [x]  Carbohydrates  [x]  Protein  [x]  Non-starchy Vegatbles  []  Food Label  [x]  Meal and Snack Ideas  [x]  Food Journals []  Diabetes  []  Cholesterol  []  Sodium  [x]  Gen Nutr Guidelines  []  SBGM Guidelines  [x]  Others: s/p GBP diet   Information Reviewed with: Patient   Readiness to Change Stage: []  Pre-contemplative    []  Contemplative  []  Preparation               [x]  Action                  []  Maintenance   Potential Barriers to Learning: []  Decline in memory    []  Language barrier   []  Other:  [x]  Emotional                  []  Limited mobility  [x]  Lack of motivation     [] Vision, hearing or cognitive impairment   Expected Compliance: Good / Fair      Nutritional Goal - To promote lifestyle changes to result in:    [x]  Weight loss  []  Improved diabetic control  []  Decreased cholesterol levels  []  Decreased blood pressure  []  Weight maintenance []  Preventing any interactions associated with food allergies  []  Adequate weight gain toward goal weight  []  Other:        Patient Goals:   1. Follow consistent and appropriate meal timing; follow a structured timeline, space meals by 2-3 hours . 2. Focus on good sources of protein; Never eat carbs alone, always pair them with protein, and make carbs the last option in controlled amount   3. Have a plan; use the meal builder tool + diet plan for good portion contol and balance, plan out meals/snacks ahead of time, and create shopping lists. No more eating after 8 pm  4. Exercise for 150 min each week divided as schedule permits.      Dietitian Signature: Linda Ma MA, RD Date: 6/30/2020   Follow-up: 29 July@ 1100 Time: 2:17 PM

## 2020-07-28 ENCOUNTER — APPOINTMENT (OUTPATIENT)
Dept: NUTRITION | Age: 45
End: 2020-07-28

## 2020-12-17 DIAGNOSIS — E03.9 HYPOTHYROIDISM, UNSPECIFIED TYPE: ICD-10-CM

## 2020-12-21 RX ORDER — LEVOTHYROXINE SODIUM 100 UG/1
TABLET ORAL
Qty: 90 TAB | Refills: 0 | OUTPATIENT
Start: 2020-12-21

## 2020-12-22 NOTE — TELEPHONE ENCOUNTER
We do not have labs on her in over a year. No refills will be given without labs. Please have her scheduled for an in office visit with me in March 2021 (30 min).     Dr. Trinity Gandara  [facility], [address] [place] [address], [address] [place] [address] Str.  Phone: [phone]  Fax: [phone]

## 2021-02-28 DIAGNOSIS — E03.9 HYPOTHYROIDISM, UNSPECIFIED TYPE: ICD-10-CM

## 2021-02-28 DIAGNOSIS — K90.9 INTESTINAL MALABSORPTION, UNSPECIFIED TYPE: ICD-10-CM

## 2021-02-28 DIAGNOSIS — E28.2 PCOS (POLYCYSTIC OVARIAN SYNDROME): ICD-10-CM

## 2021-02-28 DIAGNOSIS — E66.01 SEVERE OBESITY (HCC): ICD-10-CM

## 2021-02-28 DIAGNOSIS — E55.9 VITAMIN D DEFICIENCY: ICD-10-CM

## 2021-02-28 DIAGNOSIS — K75.81 NASH (NONALCOHOLIC STEATOHEPATITIS): Primary | ICD-10-CM

## 2021-03-01 ENCOUNTER — APPOINTMENT (OUTPATIENT)
Dept: INTERNAL MEDICINE CLINIC | Age: 46
End: 2021-03-01

## 2021-03-01 DIAGNOSIS — E55.9 VITAMIN D DEFICIENCY: ICD-10-CM

## 2021-03-01 DIAGNOSIS — E66.01 SEVERE OBESITY (HCC): ICD-10-CM

## 2021-03-01 DIAGNOSIS — K90.9 INTESTINAL MALABSORPTION, UNSPECIFIED TYPE: ICD-10-CM

## 2021-03-01 DIAGNOSIS — E28.2 PCOS (POLYCYSTIC OVARIAN SYNDROME): ICD-10-CM

## 2021-03-01 DIAGNOSIS — E03.9 HYPOTHYROIDISM, UNSPECIFIED TYPE: ICD-10-CM

## 2021-03-02 LAB
25(OH)D3 SERPL-MCNC: 20 NG/ML (ref 30–100)
ALB/GLOBRATIO, 58C: 1.4 (CALC) (ref 1–2.5)
ALBUMIN SERPL-MCNC: 3.8 G/DL (ref 3.6–5.1)
ALKALINE PHOSPHATASE, TOTAL, 25002000: 73 U/L (ref 31–125)
ALT SERPL-CCNC: 9 U/L (ref 6–29)
AST SERPL W P-5'-P-CCNC: 13 U/L (ref 10–35)
BASOPHILS # BLD: 49 CELLS/UL (ref 0–200)
BASOPHILS NFR BLD: 0.6 %
BILIRUB SERPL-MCNC: 0.4 MG/DL (ref 0.2–1.2)
BUN SERPL-MCNC: 11 MG/DL (ref 7–25)
BUN/CREATININE RATIO,BUCR: NORMAL (CALC) (ref 6–22)
CALCIUM SERPL-MCNC: 8.7 MG/DL (ref 8.6–10.2)
CHLORIDE SERPL-SCNC: 105 MMOL/L (ref 98–110)
CHOL/HDL RATIO,CHHDX: 4.3 (CALC)
CHOLEST SERPL-MCNC: 240 MG/DL
CO2 SERPL-SCNC: 30 MMOL/L (ref 20–32)
CREAT SERPL-MCNC: 0.75 MG/DL (ref 0.5–1.1)
EOSINOPHIL # BLD: 197 CELLS/UL (ref 15–500)
EOSINOPHIL NFR BLD: 2.4 %
ERYTHROCYTE [DISTWIDTH] IN BLOOD BY AUTOMATED COUNT: 15.7 % (ref 11–15)
GLOBULIN,GLOB: 2.7 G/DL (CALC) (ref 1.9–3.7)
GLUCOSE SERPL-MCNC: 90 MG/DL (ref 65–99)
HBA1C MFR BLD HPLC: 5.3 % OF TOTAL HGB
HCT VFR BLD AUTO: 39.6 % (ref 35–45)
HDLC SERPL-MCNC: 56 MG/DL
HGB BLD-MCNC: 12.4 G/DL (ref 11.7–15.5)
LDL-CHOLESTEROL: 158 MG/DL (CALC)
LYMPHOCYTES # BLD: 2517 CELLS/UL (ref 850–3900)
LYMPHOCYTES NFR BLD: 30.7 %
MCH RBC QN AUTO: 24.8 PG (ref 27–33)
MCHC RBC AUTO-ENTMCNC: 31.3 G/DL (ref 32–36)
MCV RBC AUTO: 79.4 FL (ref 80–100)
MONOCYTES # BLD: 566 CELLS/UL (ref 200–950)
MONOCYTES NFR BLD: 6.9 %
NEUTROPHILS # BLD AUTO: 4871 CELLS/UL (ref 1500–7800)
NEUTROPHILS # BLD: 59.4 %
NON-HDL CHOLESTEROL, 011976: 184 MG/DL (CALC)
PLATELET # BLD AUTO: 282 THOUSAND/UL (ref 140–400)
PMV BLD AUTO: 11.6 FL (ref 7.5–12.5)
POTASSIUM SERPL-SCNC: 4.2 MMOL/L (ref 3.5–5.3)
PROT SERPL-MCNC: 6.5 G/DL (ref 6.1–8.1)
RBC # BLD AUTO: 4.99 MILLION/UL (ref 3.8–5.1)
SODIUM SERPL-SCNC: 141 MMOL/L (ref 135–146)
T4 FREE SERPL-MCNC: 0.9 NG/DL (ref 0.8–1.8)
TRIGL SERPL-MCNC: 134 MG/DL (ref ?–150)
TSH SERPL DL<=0.005 MIU/L-ACNC: 4.66 MIU/L
WBC # BLD AUTO: 8.2 THOUSAND/UL (ref 3.8–10.8)

## 2021-03-04 NOTE — PROGRESS NOTES
Iman Davies presents today for   Chief Complaint   Patient presents with    Follow-up    Thyroid Problem    Depression    Labs     3-1-21           Coordination of Care:  1. Have you been to the ER, urgent care clinic since your last visit? Hospitalized since your last visit? no    2. Have you seen or consulted any other health care providers outside of the [address] [place] [address] since your last visit? Include any pap smears or colon screening.  yes

## 2021-03-07 NOTE — PROGRESS NOTES
I will discuss results with her at her upcoming appointment tomorrow. Her vitamin D level is mildly low at 20. Her CMP and A1c are unremarkable. TSH is elevated mildly at 4.66. It was 2.81 a year ago. Her free T4 though is low normal at 0.9. Her CBC does not significant abnormalities. Total cholesterol is 240. HDL is 56. Triglycerides are 134. LDL is 158.     Dr. Toth Friday  [facility], [address] [place] [address], [facility] [facility]  Phone: [phone]  Fax: [phone]

## 2021-03-08 ENCOUNTER — OFFICE VISIT (OUTPATIENT)
Dept: INTERNAL MEDICINE CLINIC | Age: 46
End: 2021-03-08
Payer: COMMERCIAL

## 2021-03-08 VITALS
DIASTOLIC BLOOD PRESSURE: 86 MMHG | TEMPERATURE: 97.3 F | WEIGHT: 249 LBS | SYSTOLIC BLOOD PRESSURE: 128 MMHG | HEART RATE: 73 BPM | BODY MASS INDEX: 40.02 KG/M2 | OXYGEN SATURATION: 98 % | HEIGHT: 66 IN | RESPIRATION RATE: 18 BRPM

## 2021-03-08 DIAGNOSIS — Z12.31 ENCOUNTER FOR SCREENING MAMMOGRAM FOR BREAST CANCER: ICD-10-CM

## 2021-03-08 DIAGNOSIS — E66.01 SEVERE OBESITY (HCC): ICD-10-CM

## 2021-03-08 DIAGNOSIS — H90.5 SENSORINEURAL HEARING LOSS (SNHL) OF LEFT EAR, UNSPECIFIED HEARING STATUS ON CONTRALATERAL SIDE: ICD-10-CM

## 2021-03-08 DIAGNOSIS — F32.A DEPRESSIVE DISORDER: ICD-10-CM

## 2021-03-08 DIAGNOSIS — E78.5 HYPERLIPIDEMIA, UNSPECIFIED HYPERLIPIDEMIA TYPE: ICD-10-CM

## 2021-03-08 DIAGNOSIS — E03.9 HYPOTHYROIDISM, UNSPECIFIED TYPE: Primary | ICD-10-CM

## 2021-03-08 DIAGNOSIS — E55.9 VITAMIN D DEFICIENCY: ICD-10-CM

## 2021-03-08 PROCEDURE — 99214 OFFICE O/P EST MOD 30 MIN: CPT | Performed by: INTERNAL MEDICINE

## 2021-03-08 RX ORDER — LEVOTHYROXINE SODIUM 112 UG/1
112 TABLET ORAL
Qty: 90 TAB | Refills: 3 | Status: SHIPPED | OUTPATIENT
Start: 2021-03-08

## 2021-03-08 RX ORDER — ERGOCALCIFEROL 1.25 MG/1
50000 CAPSULE ORAL
Qty: 12 CAP | Refills: 1 | Status: SHIPPED | OUTPATIENT
Start: 2021-03-08 | End: 2021-09-10 | Stop reason: SDUPTHER

## 2021-03-08 NOTE — PROGRESS NOTES
INTERNISTS OF [facility]:  3/8/2021, MRN: [id number]      Justice Bennett is a 50 y.o. female and presents to clinic for Follow-up, Thyroid Problem, Depression, and Labs (3-1-21)    Subjective: The pt is a 53yo female with h/o CHRISTIAN, PUD, vitamin D Deficiency, hearing loss (left sided from Meniere's disease). allergic rhinitis (followed by Gene Cevallos, getting allergy shots), depression/anxiety, lap band surgery, gastric bypass surgery, obesity, OA, PCOS, and hypothyroidism.      1. Vitamin D Deficiency: Her recent labs show that her level is 20. Taking OTC vitamin rx. 2. Hypothyroidism: Her TSH is elevated mildly at 4.66. It was 2.81 a year ago. Her free T4 though is low normal at 0.9. Her CBC does not significant abnormalities. She is having a hard time losing weight. On Synthroid 100mcg daily. 3. HLD: Recent labs show: Total cholesterol is 240. HDL is 56. Triglycerides are 134. LDL is 158. Not on cholesterol lowering rx. +H/o gastric bypass surgery. 4. Hearing Loss: She was diagnosed with hearing loss from Meniere's disease in November. Followed by . +Left ear hearing aid in place. She continues to get allergy shots. 5. Depression Hx: Doing well on zoloft. No adverse side effects. Patient Active Problem List    Diagnosis Date Noted    PUD (peptic ulcer disease) 02/23/2020    CHRISTIAN (nonalcoholic steatohepatitis) 02/23/2020    Allergic rhinitis 02/23/2020    Vitamin D deficiency 02/23/2020    Severe obesity ([address] 06/28/2019    Depressive disorder 12/26/2018    S/P bariatric surgery 11/30/2016    Intestinal malabsorption 11/30/2016    Arthritis     Anxiety     Hypothyroidism 03/24/2012    PCOS (polycystic ovarian syndrome) 03/24/2012       Current Outpatient Medications   Medication Sig Dispense Refill    CALCIUM PO Take  by mouth.  sertraline (ZOLOFT) 50 mg tablet TAKE 1 TAB BY MOUTH DAILY.  TAKE WITH A 25MG SERTRALINE TAB FOR A TOTAL DAILY DOSE OF 75MG 90 Tab 2    fluticasone propionate (FLONASE) 50 mcg/actuation nasal spray SPRAY 2 SPRAYS INTO EACH NOSTRIL EVERY DAY 1 Bottle 10    levothyroxine (SYNTHROID) 100 mcg tablet TAKE 1 TABLET BY MOUTH EVERY DAY BEFORE BREAKFAST 90 Tab 1    sertraline (ZOLOFT) 25 mg tablet Take 1 Tab by mouth daily. Take with a 50mg sertraline tab for a total daily dose of 75mg 90 Tab 3    cholecalciferol (VITAMIN D3) 2,000 unit cap capsule Take 2,000 Units by mouth two (2) times a day. 90 Cap 3    cetirizine (ZYRTEC) 10 mg tablet TAKE 1 TABLET (10 MG TOTAL) BY MOUTH DAILY  2    cyanocobalamin (VITAMIN B-12) 1,000 mcg sublingual tablet Take 1,000 mcg by mouth daily.  b complex vitamins tablet Take 1 Tab by mouth daily.  OTHER Take  by mouth daily. Indications: tesbo vitamin for bariatric         Allergies   Allergen Reactions    Codeine Nausea and Vomiting and Other (comments)     dizziness       Past Medical History:   Diagnosis Date    Anxiety     Arthritis     Depression     Gastric bypass status for obesity 2016    gestational diabetes     Hypothyroidism     Morbid obesity ([address]     Morbid obesity with BMI of 40.0-44.9, adult (HCC)     Nausea & vomiting     PCOS (polycystic ovarian syndrome)     no medication as of 10/16    Psychiatric disorder     depression    Seasonal allergic rhinitis        Past Surgical History:   Procedure Laterality Date    HX GI  march 2010    lap band    HX GI  07/2016    removal of gastric band    HX GYN      dx laparoscopy    HX KNEE ARTHROSCOPY Left     x 3    LAP GASTRIC BYPASS/KRYS-EN-Y      11/15/2016       Family History   Problem Relation Age of Onset    Arthritis-osteo Mother     Asthma Mother     Heart Disease Father     Diabetes Father     No Known Problems Sister     Diabetes Maternal Grandfather        Social History     Tobacco Use    Smoking status: Never Smoker    Smokeless tobacco: Never Used   Substance Use Topics    Alcohol use:  Yes Alcohol/week: 1.0 - 2.0 standard drinks     Types: 1 - 2 Glasses of wine per week       ROS   Review of Systems   Constitutional: Negative for chills and fever. HENT: Positive for hearing loss. Negative for ear pain and sore throat. Eyes: Negative for blurred vision and pain. Respiratory: Negative for cough and shortness of breath. Cardiovascular: Negative for chest pain. Gastrointestinal: Negative for abdominal pain, blood in stool and melena. Genitourinary: Negative for dysuria and hematuria. Musculoskeletal: Negative for joint pain and myalgias. Skin: Negative for rash. Neurological: Negative for tingling, focal weakness and headaches. Endo/Heme/Allergies: Does not bruise/bleed easily. Psychiatric/Behavioral: Negative for substance abuse. Objective     Vitals:    03/08/21 1158 03/08/21 1210   BP: (!) 134/90 128/86   Pulse: 73    Resp: 18    Temp: 97.3 °F (36.3 °C)    TempSrc: Temporal    SpO2: 98%    Weight: 249 lb (112.9 kg)    Height: 5' 6\" (1.676 m)    PainSc:   0 - No pain        Physical Exam  Vitals signs and nursing note reviewed. HENT:      Head: Normocephalic and atraumatic. Eyes:      General: No scleral icterus. Right eye: No discharge. Left eye: No discharge. Conjunctiva/sclera: Conjunctivae normal.   Neck:      Musculoskeletal: Neck supple. Cardiovascular:      Rate and Rhythm: Normal rate and regular rhythm. Heart sounds: Normal heart sounds. No murmur. No friction rub. No gallop. Pulmonary:      Effort: Pulmonary effort is normal. No respiratory distress. Breath sounds: Normal breath sounds. No wheezing or rales. Abdominal:      General: Bowel sounds are normal. There is no distension. Palpations: Abdomen is soft. There is no mass. Tenderness: There is no abdominal tenderness. There is no guarding or rebound. Musculoskeletal:         General: No swelling (BUE) or tenderness (BUE).    Lymphadenopathy:      Cervical: No cervical adenopathy. Skin:     General: Skin is warm and dry. Findings: No erythema. Neurological:      Mental Status: She is alert and oriented to person, place, and time. Motor: No abnormal muscle tone. Gait: Gait is intact. Gait normal.   Psychiatric:         Mood and Affect: Mood and affect normal.         LABS   Data Review:   Lab Results   Component Value Date/Time    WBC 8.2 03/01/2021 12:00 AM    HGB 12.4 03/01/2021 12:00 AM    HCT 39.6 03/01/2021 12:00 AM    PLATELET 390 72/75/5021 12:00 AM    MCV 79.4 (L) 03/01/2021 12:00 AM    Hgb, External 14.1 03/06/2013    Hct, External 36.5 03/06/2013    Platelet cnt., External 260K 03/06/2013       Lab Results   Component Value Date/Time    Sodium 141 03/01/2021 12:00 AM    Potassium 4.2 03/01/2021 12:00 AM    Chloride 105 03/01/2021 12:00 AM    CO2 30 03/01/2021 12:00 AM    Anion gap 6 02/19/2019 09:03 AM    Glucose 90 03/01/2021 12:00 AM    BUN 11 03/01/2021 12:00 AM    Creatinine 0.75 03/01/2021 12:00 AM    BUN/Creatinine ratio NOT APPLICABLE 05/47/0881 26:03 AM    GFR est  03/01/2021 12:00 AM    GFR est non-AA 95 03/01/2021 12:00 AM    Calcium 8.7 03/01/2021 12:00 AM       Lab Results   Component Value Date/Time    Cholesterol, total 240 (H) 03/01/2021 12:00 AM    HDL Cholesterol 56 03/01/2021 12:00 AM    LDL-CHOLESTEROL 158 (H) 03/01/2021 12:00 AM    LDL, calculated 144.6 (H) 02/19/2019 09:03 AM    VLDL, calculated 34.4 02/19/2019 09:03 AM    Triglyceride 134 03/01/2021 12:00 AM    CHOL/HDL Ratio 4.1 02/19/2019 09:03 AM    Cholesterol/HDL ratio 4.3 03/01/2021 12:00 AM       Lab Results   Component Value Date/Time    Hemoglobin A1c 5.3 03/01/2021 12:00 AM       Assessment/Plan:   1. Health Maintenance:  -Ordering a mammogram.    ORDERS:  - [facility] 3D ARELIS W MAMMO BI SCREENING INCL CAD; Future    2. Hypothyroidism: Free T4 is low normal.  TSH is mildly elevated.   -Increasing her Synthroid from 100 mcg to 112 mcg daily.  -We will check TFTs in 6 months. ORDERS:  - TSH AND FREE T4; Future  - levothyroxine (SYNTHROID) 112 mcg tablet; Take 1 Tab by mouth Daily (before breakfast). Dispense: 90 Tab; Refill: 3    3. Vitamin D deficiency:   -Prescribing prescription strength vitamin D. We will check a level just before her 6-month follow-up appointment. ORDERS:  - ergocalciferol (ERGOCALCIFEROL) 1,250 mcg (50,000 unit) capsule; Take 1 Cap by mouth every seven (7) days. Dispense: 12 Cap; Refill: 1    4. Hearing Loss: +H/o Ménière's disease and allergic rhinitis.  -Continue to follow-up with ENT for therapeutic recommendations. Continue with allergy shots. Continue with use of her hearing aid. 5. HLD: LDL is up to 150s. +Obesity.   -I encouraged her to exercise more into reduce her caloric intake in order to improve her weight and cholesterol. We will recheck this in a year. 6.Depression: Stable. -Continue with Rx as prescribed. Health Maintenance Due   Topic Date Due    COVID-19 Vaccine (1 of 2) Never done     Lab review: labs are reviewed in the EHR and ordered as mentioned above    I have discussed the diagnosis with the patient and the intended plan as seen in the above orders. The patient has received an after-visit summary and questions were answered concerning future plans. I have discussed medication side effects and warnings with the patient as well. I have reviewed the plan of care with the patient, accepted their input and they are in agreement with the treatment goals. All questions were answered. The patient understands the plan of care. Handouts provided today with above information. Pt instructed if symptoms worsen to call the office or report to the ED for continued care. Voice recognition was used to generate this report, which may have resulted in some phonetic based errors in grammar and contents.  Even though attempts were made to correct all the mistakes, some may have been missed, and remained in the body of the document.           Chante De La Cruz MD

## 2021-03-08 NOTE — PATIENT INSTRUCTIONS
Hypothyroidism: Care Instructions  Your Care Instructions     When you have hypothyroidism, your body doesn't make enough thyroid hormone. This hormone helps your body use energy. If your thyroid level is low, you may feel tired, be constipated, have an increase in your blood pressure, or have dry skin or memory problems. You may also get cold easily, even when it is warm. Women with low thyroid levels may have heavy menstrual periods. A blood test to find your thyroid-stimulating hormone (TSH) level is used to check for hypothyroidism. A high TSH level may mean that you have it. The treatment for hypothyroidism is thyroid hormone pills. You should start to feel better in 1 to 2 weeks. Most people need treatment for the rest of their lives. You will need regular visits with your doctor to make sure you are doing well and that you have the right dose of medicine. Follow-up care is a key part of your treatment and safety. Be sure to make and go to all appointments, and call your doctor if you are having problems. It's also a good idea to know your test results and keep a list of the medicines you take. How can you care for yourself at home? · Take your thyroid hormone medicine exactly as prescribed. Call your doctor if you think you are having a problem with your medicine. Most people do not have side effects if they take the right amount of medicine regularly. ? Take the medicine 30 minutes before breakfast, and do not take it with calcium, vitamins, or iron. ? Do not take extra doses of your thyroid medicine. It will not help you get better any faster, and it may cause side effects. ? If you forget to take a dose, do NOT take a double dose of medicine. Take your usual dose the next day. · Tell your doctor about all prescription, herbal, or over-the-counter products you take. · Take care of yourself. Eat a healthy diet, get enough sleep, and get regular exercise. When should you call for help?    Call 911 anytime you think you may need emergency care. For example, call if:    · You passed out (lost consciousness).     · You have severe trouble breathing.     · You have a very slow heartbeat (less than 60 beats a minute).     · You have a low body temperature (95°F or below). Call your doctor now or seek immediate medical care if:    · You feel tired, sluggish, or weak.     · You have trouble remembering things or concentrating.     · You do not begin to feel better 2 weeks after starting your medicine. Watch closely for changes in your health, and be sure to contact your doctor if you have any problems. Where can you learn more? Go to http://www.gray.com/  Enter B257 in the search box to learn more about \"Hypothyroidism: Care Instructions. \"  Current as of: March 31, 2020               Content Version: 12.6  © 0093-3541 [employer]. Care instructions adapted under license by [employer] (which disclaims liability or warranty for this information). If you have questions about a medical condition or this instruction, always ask your healthcare professional. [address] any warranty or liability for your use of this information.

## 2021-05-26 ENCOUNTER — HOSPITAL ENCOUNTER (OUTPATIENT)
Dept: MAMMOGRAPHY | Age: 46
Discharge: HOME | End: 2021-05-26
Attending: INTERNAL MEDICINE
Payer: COMMERCIAL

## 2021-05-26 PROCEDURE — 77063 BREAST TOMOSYNTHESIS BI: CPT

## 2021-09-03 ENCOUNTER — APPOINTMENT (OUTPATIENT)
Dept: INTERNAL MEDICINE CLINIC | Age: 46
End: 2021-09-03

## 2021-09-03 DIAGNOSIS — E03.9 HYPOTHYROIDISM, UNSPECIFIED TYPE: ICD-10-CM

## 2021-09-03 DIAGNOSIS — E55.9 VITAMIN D DEFICIENCY: ICD-10-CM

## 2021-09-04 LAB
25(OH)D3 SERPL-MCNC: 31 NG/ML (ref 30–100)
T4 FREE SERPL-MCNC: 0.9 NG/DL (ref 0.8–1.8)
TSH SERPL DL<=0.005 MIU/L-ACNC: 4.16 MIU/L

## 2021-09-06 NOTE — PROGRESS NOTES
I will let her know at her upcoming appointment that her TSH is normal at 4.16. Her free T4 is normal at 0.9. Her vitamin D level is normal as well, measuring 31.     Dr. Thaddeus Brittle  [facility], [address] [place] [address] Str.  Phone: [phone]  Fax: [phone]

## 2021-09-09 NOTE — PROGRESS NOTES
Alize Gallegos presents today for   Chief Complaint   Patient presents with    Follow-up    Labs     9-3-21    Foot Pain     Left heel x 2 months. Coordination of Care:  1. Have you been to the ER, urgent care clinic since your last visit? Hospitalized since your last visit? no    2. Have you seen or consulted any other health care providers outside of the [address] [place] [address] since your last visit? Include any pap smears or colon screening.  yes

## 2021-09-10 ENCOUNTER — OFFICE VISIT (OUTPATIENT)
Dept: INTERNAL MEDICINE CLINIC | Age: 46
End: 2021-09-10
Payer: COMMERCIAL

## 2021-09-10 VITALS
HEIGHT: 66 IN | HEART RATE: 91 BPM | WEIGHT: 253 LBS | TEMPERATURE: 98.2 F | DIASTOLIC BLOOD PRESSURE: 86 MMHG | OXYGEN SATURATION: 98 % | RESPIRATION RATE: 16 BRPM | BODY MASS INDEX: 40.66 KG/M2 | SYSTOLIC BLOOD PRESSURE: 133 MMHG

## 2021-09-10 DIAGNOSIS — M79.672 CHRONIC HEEL PAIN, LEFT: ICD-10-CM

## 2021-09-10 DIAGNOSIS — G89.29 CHRONIC HEEL PAIN, LEFT: ICD-10-CM

## 2021-09-10 DIAGNOSIS — N32.81 OAB (OVERACTIVE BLADDER): ICD-10-CM

## 2021-09-10 DIAGNOSIS — E66.01 SEVERE OBESITY (HCC): ICD-10-CM

## 2021-09-10 DIAGNOSIS — M79.672 CHRONIC HEEL PAIN, LEFT: Primary | ICD-10-CM

## 2021-09-10 DIAGNOSIS — E28.2 PCOS (POLYCYSTIC OVARIAN SYNDROME): ICD-10-CM

## 2021-09-10 DIAGNOSIS — E55.9 VITAMIN D DEFICIENCY: ICD-10-CM

## 2021-09-10 DIAGNOSIS — E03.9 HYPOTHYROIDISM, UNSPECIFIED TYPE: ICD-10-CM

## 2021-09-10 DIAGNOSIS — G89.29 CHRONIC HEEL PAIN, LEFT: Primary | ICD-10-CM

## 2021-09-10 DIAGNOSIS — K75.81 NASH (NONALCOHOLIC STEATOHEPATITIS): ICD-10-CM

## 2021-09-10 PROCEDURE — 99214 OFFICE O/P EST MOD 30 MIN: CPT | Performed by: INTERNAL MEDICINE

## 2021-09-10 RX ORDER — ERGOCALCIFEROL 1.25 MG/1
50000 CAPSULE ORAL
Qty: 12 CAPSULE | Refills: 1 | Status: SHIPPED | OUTPATIENT
Start: 2021-09-10 | End: 2022-03-15 | Stop reason: SDUPTHER

## 2021-09-10 NOTE — PROGRESS NOTES
INTERNISTS OF [facility]:  9/10/2021, MRN: [id number]      Conor Gan is a 48 y.o. female and presents to clinic for Follow-up, Labs (9-3-21), and Foot Pain (Left heel x 2 months. )      Subjective: The pt is a 46yo female with h/o CHRISTIAN, PUD, vitamin D Deficiency, hearing loss (left sided from Meniere's disease). allergic rhinitis (followed by Sandra Galvan, getting allergy shots), depression/anxiety, lap band surgery, gastric bypass surgery, obesity, OA, PCOS, and hypothyroidism. 1. Left Heel Pain: Pain is excruciating. Pain is off/on. Pain is there 85% of the time. She tried some exercises but sx have not resolved. She changed her shoes and sx have persisted. Sx have slowly improved. Pain has been present since April. No paresthesias/weakness. No trauma. Location of pain: medial heel area. 2. OAB: She has leaks urine due to urgency sx - she has had 5 times this year. She cannot hold her urine the way she used to. She declines medication. She declines seeing Urology at this time. +ETOH and coffee rarely. She drinks green tea daily. 3. CHRISTIAN: She consumes ETOH every other weekend (2-3 glasses of wine). 4. Hypothyroidism: Taking Synthroid. Recent TFTs are WNL. 5. Vitamin D Deficiency: Her most labs show that her vitamin D level is 31 on prescription strength vitamin D.     6. HLD and PCOS Hx: Her LDL earlier this year was in the 150s range. Her A1C was WNL earlier this year.         Patient Active Problem List    Diagnosis Date Noted    Sensorineural hearing loss (SNHL) of left ear 03/08/2021    Hyperlipidemia 03/08/2021    PUD (peptic ulcer disease) 02/23/2020    CHRISTIAN (nonalcoholic steatohepatitis) 02/23/2020    Allergic rhinitis 02/23/2020    Vitamin D deficiency 02/23/2020    Severe obesity ([address] 06/28/2019    Depressive disorder 12/26/2018    S/P bariatric surgery 11/30/2016    Intestinal malabsorption 11/30/2016    Arthritis     Anxiety     Hypothyroidism 03/24/2012    PCOS (polycystic ovarian syndrome) 03/24/2012       Current Outpatient Medications   Medication Sig Dispense Refill    CALCIUM PO Take  by mouth.  levothyroxine (SYNTHROID) 112 mcg tablet Take 1 Tab by mouth Daily (before breakfast). 90 Tab 3    sertraline (ZOLOFT) 50 mg tablet TAKE 1 TAB BY MOUTH DAILY. TAKE WITH A 25MG SERTRALINE TAB FOR A TOTAL DAILY DOSE OF 75MG 90 Tab 2    fluticasone propionate (FLONASE) 50 mcg/actuation nasal spray SPRAY 2 SPRAYS INTO EACH NOSTRIL EVERY DAY 1 Bottle 10    sertraline (ZOLOFT) 25 mg tablet Take 1 Tab by mouth daily. Take with a 50mg sertraline tab for a total daily dose of 75mg 90 Tab 3    cholecalciferol (VITAMIN D3) 2,000 unit cap capsule Take 2,000 Units by mouth two (2) times a day. 90 Cap 3    OTHER Take  by mouth daily. Indications: tesbo vitamin for bariatric      cetirizine (ZYRTEC) 10 mg tablet TAKE 1 TABLET (10 MG TOTAL) BY MOUTH DAILY  2    cyanocobalamin (VITAMIN B-12) 1,000 mcg sublingual tablet Take 1,000 mcg by mouth daily.  b complex vitamins tablet Take 1 Tab by mouth daily.  ergocalciferol (ERGOCALCIFEROL) 1,250 mcg (50,000 unit) capsule Take 1 Cap by mouth every seven (7) days.  (Patient not taking: Reported on 9/10/2021) 12 Cap 1       Allergies   Allergen Reactions    Codeine Nausea and Vomiting and Other (comments)     dizziness       Past Medical History:   Diagnosis Date    Anxiety     Arthritis     Depression     Gastric bypass status for obesity 2016    gestational diabetes     Hypothyroidism     Morbid obesity ([facility] [address]     Morbid obesity with BMI of 40.0-44.9, adult (HCC)     Nausea & vomiting     PCOS (polycystic ovarian syndrome)     no medication as of 10/16    Psychiatric disorder     depression    Seasonal allergic rhinitis        Past Surgical History:   Procedure Laterality Date    HX GI  march 2010    lap band    HX GI  07/2016    removal of gastric band    HX GYN      dx laparoscopy    HX KNEE ARTHROSCOPY Left     x 3    LAP GASTRIC BYPASS/KRYS-EN-Y      11/15/2016       Family History   Problem Relation Age of Onset    Arthritis-osteo Mother     Asthma Mother     Heart Disease Father     Diabetes Father     No Known Problems Sister     Diabetes Maternal Grandfather        Social History     Tobacco Use    Smoking status: Never Smoker    Smokeless tobacco: Never Used   Substance Use Topics    Alcohol use: Yes     Alcohol/week: 1.0 - 2.0 standard drinks     Types: 1 - 2 Glasses of wine per week       ROS   Review of Systems   Constitutional: Negative for chills and fever. HENT: Negative for ear pain and sore throat. Eyes: Negative for blurred vision and pain. Respiratory: Negative for cough and shortness of breath. Cardiovascular: Negative for chest pain. Gastrointestinal: Negative for abdominal pain, blood in stool and melena. Genitourinary: Negative for dysuria and hematuria. Musculoskeletal: Positive for joint pain. Negative for myalgias. Skin: Negative for rash. Neurological: Negative for tingling, focal weakness and headaches. Endo/Heme/Allergies: Does not bruise/bleed easily. Psychiatric/Behavioral: Negative for substance abuse. Objective     Vitals:    09/10/21 1405 09/10/21 1411   BP: (!) 136/93 133/86   Pulse: 91    Resp: 16    Temp: 98.2 °F (36.8 °C)    TempSrc: Temporal    SpO2: 98%    Weight: 253 lb (114.8 kg)    Height: 5' 6\" (1.676 m)    PainSc:   2    PainLoc: Foot        Physical Exam  Vitals and nursing note reviewed. HENT:      Head: Normocephalic and atraumatic. Right Ear: External ear normal.      Left Ear: External ear normal.   Eyes:      General: No scleral icterus. Right eye: No discharge. Left eye: No discharge. Conjunctiva/sclera: Conjunctivae normal.   Cardiovascular:      Rate and Rhythm: Normal rate and regular rhythm. Heart sounds: Normal heart sounds. No murmur heard. No friction rub. No gallop.     Pulmonary: Effort: Pulmonary effort is normal. No respiratory distress. Breath sounds: Normal breath sounds. No wheezing or rales. Abdominal:      General: Bowel sounds are normal. There is no distension. Palpations: Abdomen is soft. There is no mass. Tenderness: There is no abdominal tenderness. There is no guarding or rebound. Musculoskeletal:         General: No swelling (BUE) or tenderness (BUE). Cervical back: Neck supple. Comments: Left foot exam except for the area along her left heel   Lymphadenopathy:      Cervical: No cervical adenopathy. Skin:     General: Skin is warm and dry. Findings: No erythema. Neurological:      Mental Status: She is alert and oriented to person, place, and time. Motor: No abnormal muscle tone. Gait: Gait is intact.  Gait normal.   Psychiatric:         Mood and Affect: Mood and affect normal.         LABS   Data Review:   Lab Results   Component Value Date/Time    WBC 8.2 03/01/2021 12:00 AM    HGB 12.4 03/01/2021 12:00 AM    HCT 39.6 03/01/2021 12:00 AM    PLATELET 879 52/47/3312 12:00 AM    MCV 79.4 (L) 03/01/2021 12:00 AM    Hgb, External 14.1 03/06/2013 12:00 AM    Hct, External 36.5 03/06/2013 12:00 AM    Platelet cnt., External 260K 03/06/2013 12:00 AM       Lab Results   Component Value Date/Time    Sodium 141 03/01/2021 12:00 AM    Potassium 4.2 03/01/2021 12:00 AM    Chloride 105 03/01/2021 12:00 AM    CO2 30 03/01/2021 12:00 AM    Anion gap 6 02/19/2019 09:03 AM    Glucose 90 03/01/2021 12:00 AM    BUN 11 03/01/2021 12:00 AM    Creatinine 0.75 03/01/2021 12:00 AM    BUN/Creatinine ratio NOT APPLICABLE 82/57/9601 18:11 AM    GFR est  03/01/2021 12:00 AM    GFR est non-AA 95 03/01/2021 12:00 AM    Calcium 8.7 03/01/2021 12:00 AM       Lab Results   Component Value Date/Time    Cholesterol, total 240 (H) 03/01/2021 12:00 AM    HDL Cholesterol 56 03/01/2021 12:00 AM    LDL-CHOLESTEROL 158 (H) 03/01/2021 12:00 AM    LDL, calculated 144.6 (H) 02/19/2019 09:03 AM    VLDL, calculated 34.4 02/19/2019 09:03 AM    Triglyceride 134 03/01/2021 12:00 AM    CHOL/HDL Ratio 4.1 02/19/2019 09:03 AM    Cholesterol/HDL ratio 4.3 03/01/2021 12:00 AM       Lab Results   Component Value Date/Time    Hemoglobin A1c 5.3 03/01/2021 12:00 AM       Assessment/Plan:   1. OAB (overactive bladder):  - We discussed pharmacotherapy options. She will let me know if she is interested in this option  - Planned voiding.   - Avoidance of bladder irritants    2. Chronic heel pain, left: From a bone spur? - Left heel xray ordered. - Referral to Podiatry    ORDERS:  - XR CALCANEUS LT; Future  - REFERRAL TO PODIATRY    3. Vitamin D deficiency:+Vitamin D level is low normal.  - Refilling her vitamin D. Take OTC vitamin D thereafter  - Checking labs in 6 months    ORDERS:  - ergocalciferol (ERGOCALCIFEROL) 1,250 mcg (50,000 unit) capsule; Take 1 Capsule by mouth every seven (7) days. Dispense: 12 Capsule; Refill: 1    4. NAFLD:   - I encouraged her to maintain a heart healthy diet and to reduce her weight. - Avoidance of ETOH beverage.   - Checking labs in 6 months    5. Hypothyroidism: Stable. - C/w rx as prescribed. - Checking labs in 6 months    6. HLD and PCOS:   - Lifestyle changes as mentioned above. - Checking labs in 6 months      Health Maintenance Due   Topic Date Due    Colorectal Cancer Screening Combo  Never done    Cervical cancer screen  11/01/2019    Flu Vaccine (1) 09/01/2021     Lab review: labs are reviewed in the EHR    I have discussed the diagnosis with the patient and the intended plan as seen in the above orders. The patient has received an after-visit summary and questions were answered concerning future plans. I have discussed medication side effects and warnings with the patient as well. I have reviewed the plan of care with the patient, accepted their input and they are in agreement with the treatment goals. All questions were answered.  The patient understands the plan of care. Handouts provided today with above information. Pt instructed if symptoms worsen to call the office or report to the ED for continued care. Greater than 50% of the visit time was spent in counseling and/or coordination of care. Voice recognition was used to generate this report, which may have resulted in some phonetic based errors in grammar and contents. Even though attempts were made to correct all the mistakes, some may have been missed, and remained in the body of the document.           Esther Bennett MD

## 2021-09-24 ENCOUNTER — HOSPITAL ENCOUNTER (OUTPATIENT)
Dept: GENERAL RADIOLOGY | Age: 46
Discharge: HOME | End: 2021-09-24
Payer: COMMERCIAL

## 2021-09-24 PROCEDURE — 73650 X-RAY EXAM OF HEEL: CPT

## 2021-09-28 NOTE — PROGRESS NOTES
Please let her know that her x-ray shows findings consistent with a plantar heel spur. This is likely the source of her pain. She also has a spur along her Achilles tendon.     Dr. Swati Zelaya  [facility], [address] [place] [address], [address] [place] [address] Str.  Phone: [phone]  Fax: [phone]

## 2021-09-29 NOTE — PROGRESS NOTES
Patient reached and made aware of XR results per Dr. Franky Rajan. Patient verbalized understanding and was given number to Dr. Angela Joya office.

## 2021-11-02 ENCOUNTER — TRANSCRIBE ORDER (OUTPATIENT)
Dept: SCHEDULING | Age: 46
End: 2021-11-02

## 2021-11-02 DIAGNOSIS — M72.2 PLANTAR FASCIAL FIBROMATOSIS: Primary | ICD-10-CM

## 2022-01-21 ENCOUNTER — HOSPITAL ENCOUNTER (OUTPATIENT)
Age: 47
Discharge: HOME | End: 2022-01-21
Attending: PODIATRIST

## 2022-01-21 DIAGNOSIS — M72.2 PLANTAR FASCIAL FIBROMATOSIS: ICD-10-CM

## 2022-03-07 ENCOUNTER — TELEPHONE (OUTPATIENT)
Dept: INTERNAL MEDICINE CLINIC | Age: 47
End: 2022-03-07

## 2022-03-07 NOTE — TELEPHONE ENCOUNTER
Yes, she should still get labs as scheduled. Please have her keep her lab apt.     Dr. Darion Arias  [facility], [address] [facility], [address] [place] [address] Str.  Phone: [phone]  Fax: [phone]

## 2022-03-07 NOTE — TELEPHONE ENCOUNTER
----- Message from Grace Mitchell sent at 3/7/2022  9:17 AM EST -----  Subject: Message to Provider    QUESTIONS  Information for Provider? Pt said she was not able to take her medication   for 2 days because she was out of town. Pt wants to know should she still   come in for her lab work. Pt is worried that her levels wont be accurate   since she has been of for 2 days . ---------------------------------------------------------------------------  --------------  Lety FOUNTAIN  What is the best way for the office to contact you? OK to leave message on   voicemail  Preferred Call Back Phone Number? [phone]  ---------------------------------------------------------------------------  --------------  SCRIPT ANSWERS  Relationship to Patient?  Self

## 2022-03-10 NOTE — PROGRESS NOTES
Gurmeet Armenta presents today for   Chief Complaint   Patient presents with    Follow-up       1. \"Have you been to the ER, urgent care clinic since your last visit? Hospitalized since your last visit? \" no    2. \"Have you seen or consulted any other health care providers outside of the [address] [place] [address] since your last visit? \" yes     3. For patients aged 39-70: Has the patient had a colonoscopy / FIT/ Cologuard? No      If the patient is female:    4. For patients aged 41-77: Has the patient had a mammogram within the past 2 years? Yes - no Care Gap present  See top three    5. For patients aged 21-65: Has the patient had a pap smear?  Yes - no Care Gap present

## 2022-03-11 ENCOUNTER — OFFICE VISIT (OUTPATIENT)
Dept: INTERNAL MEDICINE CLINIC | Age: 47
End: 2022-03-11
Payer: COMMERCIAL

## 2022-03-11 ENCOUNTER — HOSPITAL ENCOUNTER (OUTPATIENT)
Dept: LAB | Age: 47
Discharge: HOME | End: 2022-03-11

## 2022-03-11 VITALS
TEMPERATURE: 98.1 F | BODY MASS INDEX: 40.34 KG/M2 | HEART RATE: 84 BPM | OXYGEN SATURATION: 97 % | SYSTOLIC BLOOD PRESSURE: 123 MMHG | WEIGHT: 251 LBS | RESPIRATION RATE: 16 BRPM | DIASTOLIC BLOOD PRESSURE: 83 MMHG | HEIGHT: 66 IN

## 2022-03-11 DIAGNOSIS — F41.9 ANXIETY: ICD-10-CM

## 2022-03-11 DIAGNOSIS — E28.2 PCOS (POLYCYSTIC OVARIAN SYNDROME): ICD-10-CM

## 2022-03-11 DIAGNOSIS — N76.1 CHRONIC VAGINITIS: ICD-10-CM

## 2022-03-11 DIAGNOSIS — E03.9 HYPOTHYROIDISM, UNSPECIFIED TYPE: Primary | ICD-10-CM

## 2022-03-11 DIAGNOSIS — E78.5 HYPERLIPIDEMIA, UNSPECIFIED HYPERLIPIDEMIA TYPE: ICD-10-CM

## 2022-03-11 LAB — XX-LABCORP SPECIMEN COL,LCBCF: NORMAL

## 2022-03-11 PROCEDURE — 99001 SPECIMEN HANDLING PT-LAB: CPT

## 2022-03-11 PROCEDURE — 99214 OFFICE O/P EST MOD 30 MIN: CPT | Performed by: INTERNAL MEDICINE

## 2022-03-11 RX ORDER — FLUCONAZOLE 150 MG/1
150 TABLET ORAL DAILY
Qty: 1 TABLET | Refills: 1 | Status: SHIPPED | OUTPATIENT
Start: 2022-03-11 | End: 2022-03-12

## 2022-03-11 RX ORDER — SERTRALINE HYDROCHLORIDE 100 MG/1
100 TABLET, FILM COATED ORAL DAILY
Qty: 30 TABLET | Refills: 5 | Status: SHIPPED | OUTPATIENT
Start: 2022-03-11 | End: 2022-04-13 | Stop reason: SDUPTHER

## 2022-03-11 NOTE — PROGRESS NOTES
[facility] OF [facility]:  3/11/2022, MRN: [id number]      Kari Vaughn is a 48 y.o. female and presents to clinic for Follow-up      Subjective: The pt is a 46yo female with h/o CHRISTIAN, PUD, vitamin D Deficiency, hearing loss (left sided from Meniere's disease). allergic rhinitis (followed by Lula Roberson, getting allergy shots), depression/anxiety, lap band surgery, gastric bypass surgery, obesity, OA, PCOS, and hypothyroidism. 1. Hypothyroidism: Taking Synthroid 112mcg daily. She missed two days of rx over this weekend. 2. Frequency Vaginal Yeast Infections: She has had one every other month, relieved with OTC monistat. No new products/soaps. Asymptomatic. She has a gynecologist. Galilea Buckle are regular. 3. HLD: Her BMI is 40. She is overdue for labs. 4. Depression/Anxiety: She believes her sertraline is helping. She does not believe it should be increased. She has very little depression. Her anxiety is very well controlled with sertraline. +Lack of motivation. +PCOS. Patient Active Problem List    Diagnosis Date Noted    Sensorineural hearing loss (SNHL) of left ear 03/08/2021    Hyperlipidemia 03/08/2021    PUD (peptic ulcer disease) 02/23/2020    CHRISTIAN (nonalcoholic steatohepatitis) 02/23/2020    Allergic rhinitis 02/23/2020    Vitamin D deficiency 02/23/2020    Severe obesity ([address] 06/28/2019    Depressive disorder 12/26/2018    S/P bariatric surgery 11/30/2016    Intestinal malabsorption 11/30/2016    Arthritis     Anxiety     Hypothyroidism 03/24/2012    PCOS (polycystic ovarian syndrome) 03/24/2012       Current Outpatient Medications   Medication Sig Dispense Refill    CALCIUM PO Take  by mouth.  levothyroxine (SYNTHROID) 112 mcg tablet Take 1 Tab by mouth Daily (before breakfast). 90 Tab 3    sertraline (ZOLOFT) 50 mg tablet TAKE 1 TAB BY MOUTH DAILY.  TAKE WITH A 25MG SERTRALINE TAB FOR A TOTAL DAILY DOSE OF 75MG 90 Tab 2    fluticasone propionate (FLONASE) 50 mcg/actuation nasal spray SPRAY 2 SPRAYS INTO EACH NOSTRIL EVERY DAY 1 Bottle 10    sertraline (ZOLOFT) 25 mg tablet Take 1 Tab by mouth daily. Take with a 50mg sertraline tab for a total daily dose of 75mg 90 Tab 3    cholecalciferol (VITAMIN D3) 2,000 unit cap capsule Take 2,000 Units by mouth two (2) times a day. 90 Cap 3    OTHER Take  by mouth daily. Indications: tesbo vitamin for bariatric      cetirizine (ZYRTEC) 10 mg tablet TAKE 1 TABLET (10 MG TOTAL) BY MOUTH DAILY  2    cyanocobalamin (VITAMIN B-12) 1,000 mcg sublingual tablet Take 1,000 mcg by mouth daily.  b complex vitamins tablet Take 1 Tab by mouth daily.  ergocalciferol (ERGOCALCIFEROL) 1,250 mcg (50,000 unit) capsule Take 1 Capsule by mouth every seven (7) days.  (Patient not taking: Reported on 3/11/2022) 12 Capsule 1       Allergies   Allergen Reactions    Codeine Nausea and Vomiting and Other (comments)     dizziness       Past Medical History:   Diagnosis Date    Anxiety     Arthritis     Depression     Gastric bypass status for obesity 2016    gestational diabetes     Hypothyroidism     Morbid obesity ([address]     Morbid obesity with BMI of 40.0-44.9, adult (HCC)     Nausea & vomiting     PCOS (polycystic ovarian syndrome)     no medication as of 10/16    Psychiatric disorder     depression    Seasonal allergic rhinitis        Past Surgical History:   Procedure Laterality Date    HX GI  march 2010    lap band    HX GI  07/2016    removal of gastric band    HX GYN      dx laparoscopy    HX KNEE ARTHROSCOPY Left     x 3    LAP GASTRIC BYPASS/KRYS-EN-Y      11/15/2016       Family History   Problem Relation Age of Onset    OSTEOARTHRITIS Mother     Asthma Mother     Heart Disease Father     Diabetes Father     No Known Problems Sister     Diabetes Maternal Grandfather        Social History     Tobacco Use    Smoking status: Never Smoker    Smokeless tobacco: Never Used   Substance Use Topics    Alcohol use: Yes     Alcohol/week: 1.0 - 2.0 standard drink     Types: 1 - 2 Glasses of wine per week       ROS   Review of Systems   Constitutional: Negative for chills and fever. HENT: Negative for ear pain and sore throat. Eyes: Negative for blurred vision and pain. Respiratory: Negative for cough and shortness of breath. Cardiovascular: Negative for chest pain. Gastrointestinal: Negative for abdominal pain, blood in stool and melena. Genitourinary: Negative for dysuria and hematuria. Musculoskeletal: Negative for joint pain and myalgias. Skin: Negative for rash. Neurological: Negative for headaches. Endo/Heme/Allergies: Does not bruise/bleed easily. Psychiatric/Behavioral: Negative for substance abuse. The patient is nervous/anxious. Objective     Vitals:    03/11/22 0909 03/11/22 0914   BP: (!) 136/91 123/83   Pulse: 84    Resp: 16    Temp: 98.1 °F (36.7 °C)    TempSrc: Temporal    SpO2: 97%    Weight: 251 lb (113.9 kg)    Height: 5' 6\" (1.676 m)    PainSc:   0 - No pain        Physical Exam  Vitals and nursing note reviewed. HENT:      Head: Normocephalic and atraumatic. Right Ear: External ear normal.      Left Ear: External ear normal.   Eyes:      General: No scleral icterus. Right eye: No discharge. Left eye: No discharge. Conjunctiva/sclera: Conjunctivae normal.   Cardiovascular:      Rate and Rhythm: Normal rate and regular rhythm. Heart sounds: Normal heart sounds. No murmur heard. No friction rub. No gallop. Pulmonary:      Effort: Pulmonary effort is normal. No respiratory distress. Breath sounds: Normal breath sounds. No wheezing or rales. Abdominal:      General: Bowel sounds are normal. There is no distension. Palpations: Abdomen is soft. There is no mass. Tenderness: There is no abdominal tenderness. There is no guarding or rebound. Musculoskeletal:         General: No swelling (BUE) or tenderness (BUE). Cervical back: Neck supple. Lymphadenopathy:      Cervical: No cervical adenopathy. Skin:     General: Skin is warm and dry. Findings: No erythema or rash. Neurological:      Mental Status: She is alert. Motor: No abnormal muscle tone. Gait: Gait normal.   Psychiatric:         Mood and Affect: Mood normal.         LABS   Data Review:   Lab Results   Component Value Date/Time    WBC 8.2 03/01/2021 12:00 AM    HGB 12.4 03/01/2021 12:00 AM    HCT 39.6 03/01/2021 12:00 AM    PLATELET 248 36/26/2111 12:00 AM    MCV 79.4 (L) 03/01/2021 12:00 AM    Hgb, External 14.1 03/06/2013 12:00 AM    Hct, External 36.5 03/06/2013 12:00 AM    Platelet cnt., External 260K 03/06/2013 12:00 AM       Lab Results   Component Value Date/Time    Sodium 141 03/01/2021 12:00 AM    Potassium 4.2 03/01/2021 12:00 AM    Chloride 105 03/01/2021 12:00 AM    CO2 30 03/01/2021 12:00 AM    Anion gap 6 02/19/2019 09:03 AM    Glucose 90 03/01/2021 12:00 AM    BUN 11 03/01/2021 12:00 AM    Creatinine 0.75 03/01/2021 12:00 AM    BUN/Creatinine ratio NOT APPLICABLE 39/71/1693 28:89 AM    GFR est  03/01/2021 12:00 AM    GFR est non-AA 95 03/01/2021 12:00 AM    Calcium 8.7 03/01/2021 12:00 AM       Lab Results   Component Value Date/Time    Cholesterol, total 240 (H) 03/01/2021 12:00 AM    HDL Cholesterol 56 03/01/2021 12:00 AM    LDL-CHOLESTEROL 158 (H) 03/01/2021 12:00 AM    LDL, calculated 144.6 (H) 02/19/2019 09:03 AM    VLDL, calculated 34.4 02/19/2019 09:03 AM    Triglyceride 134 03/01/2021 12:00 AM    CHOL/HDL Ratio 4.1 02/19/2019 09:03 AM    Cholesterol/HDL ratio 4.3 03/01/2021 12:00 AM       Lab Results   Component Value Date/Time    Hemoglobin A1c 5.3 03/01/2021 12:00 AM       Assessment/Plan:   1. Chronic vaginitis:   - I encouraged her to f/u with GYN team for routine exams.  - Prescribing diflucan course prn yeast infection. ORDERS:  - fluconazole (DIFLUCAN) 150 mg tablet; Take 1 Tablet by mouth daily for 1 day. FDA advises cautious prescribing of oral fluconazole in pregnancy. Dispense: 1 Tablet; Refill: 1    2. Hypothyroidism: Overdue for labs. - Checking labs now and in a year. - C/w rx pending results. 3. Anxiety: Stable. - C/w rx as prescribed. - She will notify me if sx worsening. ORDERS:  - sertraline (ZOLOFT) 100 mg tablet; Take 1 Tablet by mouth daily. Dispense: 30 Tablet; Refill: 5    4. HLD Hx: Overdue for labs. - I encouraged her to get labs. - We will check labs again in a year. Health Maintenance Due   Topic Date Due    Colorectal Cancer Screening Combo  Never done    Flu Vaccine (1) 09/01/2021    COVID-19 Vaccine (3 - Booster for [employer] series) 09/27/2021         Lab review: labs are reviewed in the EHR and ordered as mentioned above    I have discussed the diagnosis with the patient and the intended plan as seen in the above orders. The patient has received an after-visit summary and questions were answered concerning future plans. I have discussed medication side effects and warnings with the patient as well. I have reviewed the plan of care with the patient, accepted their input and they are in agreement with the treatment goals. All questions were answered. The patient understands the plan of care. Handouts provided today with above information. Pt instructed if symptoms worsen to call the office or report to the ED for continued care. Greater than 50% of the visit time was spent in counseling and/or coordination of care.    '  Voice recognition was used to generate this report, which may have resulted in some phonetic based errors in grammar and contents. Even though attempts were made to correct all the mistakes, some may have been missed, and remained in the body of the document.           Shanda Arora MD

## 2022-03-11 NOTE — PATIENT INSTRUCTIONS
Sertraline (By mouth)   Sertraline (SER-tra-hali)  Treats depression, obsessive-compulsive disorder (OCD), posttraumatic stress disorder (PTSD), premenstrual dysphoric disorder (PMDD), social anxiety disorder, and panic disorder. This medicine is an SSRI. Brand Name(s): Zoloft   There may be other brand names for this medicine. When This Medicine Should Not Be Used: This medicine is not right for everyone. Do not use it if you had an allergic reaction to sertraline. How to Use This Medicine:   Liquid, Tablet  · Take your medicine as directed. Your dose may need to be changed several times to find what works best for you. You may need to take it for a few weeks or months before you feel better. · Oral liquid: Use the dropper provided to remove the medicine and mix it with 1/2 cup (4 ounces) of water, ginger ale, lemon-lime soda, lemonade, or orange juice. Drink the mixture right away. It is normal for it to look a bit hazy. · This medicine should come with a Medication Guide. Ask your pharmacist for a copy if you do not have one. · Missed dose: Take a dose as soon as you remember. If it is almost time for your next dose, wait until then and take a regular dose. Do not take extra medicine to make up for a missed dose. · Store the medicine in a closed container at room temperature, away from heat, moisture, and direct light. Drugs and Foods to Avoid:   Ask your doctor or pharmacist before using any other medicine, including over-the-counter medicines, vitamins, and herbal products. · Do not use this medicine together with pimozide. Do not use this medicine and an MAO inhibitor (MAOI) within 14 days of each other. Do not use the oral liquid form of sertraline if you are also using disulfiram.  · Some medicines can affect how sertraline works.  Tell your doctor if you are using the following:   ¨ Buspirone, cimetidine, cisapride, diazepam, digitoxin, fentanyl, flecainide, lithium, phenytoin, propafenone, [place] Mike's wort, tramadol, tryptophan supplements, or valproate  ¨ A blood thinner (such as warfarin), a diuretic (water pill), an NSAID pain or arthritis medicine (such as aspirin, diclofenac, ibuprofen), a tricyclic antidepressant, a triptan medicine for migraine headaches  · Do not drink alcohol while you are using this medicine. Warnings While Using This Medicine:   · Tell your doctor if you are pregnant or breastfeeding, or if you have liver disease, bleeding problems, glaucoma, heart disease, or a seizure disorder. · For some children, teenagers, and young adults, this medicine may increase mental or emotional problems. This may lead to thoughts of suicide and violence. Talk with your doctor right away if you have any thoughts or behavior changes that concern you. Tell your doctor if you or anyone in your family has a history of bipolar disorder or suicide attempts. · This medicine may cause the following problems:   ¨ Serotonin syndrome (when taken with certain medicines)  ¨ Low sodium levels (more common in elderly patients and those who take diuretics or become dehydrated)  · Tell your doctor if you are sensitive to latex, because the oral liquid comes with a latex rubber dropper. · This medicine may make you dizzy or drowsy. Do not drive or do anything that could be dangerous until you know how this medicine affects you. · Do not stop using this medicine suddenly. Your doctor will need to slowly decrease your dose before you stop it completely. · Your doctor will check your progress and the effects of this medicine at regular visits. Keep all appointments. · Keep all medicine out of the reach of children. Never share your medicine with anyone.   Possible Side Effects While Using This Medicine:   Call your doctor right away if you notice any of these side effects:  · Allergic reaction: Itching or hives, swelling in your face or hands, swelling or tingling in your mouth or throat, chest tightness, trouble breathing  · Anxiety, restlessness, fast heartbeat, fever, sweating, muscle spasms, twitching, nausea, vomiting, diarrhea, seeing or hearing things that are not there  · Blistering, peeling, or red skin rash  · Confusion, weakness, and muscle twitching  · Eye pain, vision changes, seeing halos around lights  · Feeling more excited or energetic than usual  · Thoughts of hurting yourself or others, unusual behavior  · Unusual bleeding or bruising  If you notice these less serious side effects, talk with your doctor:   · Dry mouth  · Loss of appetite, weight loss  · Mild diarrhea, constipation, nausea, vomiting  · Sexual problems  · Sleepiness, or trouble sleeping  If you notice other side effects that you think are caused by this medicine, tell your doctor. Call your doctor for medical advice about side effects. You may report side effects to FDA at 5-442-FDA-2880  © 2017 [facility] Information is for End User's use only and may not be sold, redistributed or otherwise used for commercial purposes. The above information is an  only. It is not intended as medical advice for individual conditions or treatments. Talk to your doctor, nurse or pharmacist before following any medical regimen to see if it is safe and effective for you.

## 2022-03-12 LAB
25(OH)D3+25(OH)D2 SERPL-MCNC: 23.6 NG/ML (ref 30–100)
ALBUMIN SERPL-MCNC: 4.2 G/DL (ref 3.8–4.8)
ALBUMIN/GLOB SERPL: 1.4 {RATIO} (ref 1.2–2.2)
ALP SERPL-CCNC: 83 IU/L (ref 44–121)
ALT SERPL-CCNC: 11 IU/L (ref 0–32)
AST SERPL-CCNC: 17 IU/L (ref 0–40)
BASOPHILS # BLD AUTO: 0.1 X10E3/UL (ref 0–0.2)
BASOPHILS NFR BLD AUTO: 1 %
BILIRUB SERPL-MCNC: 0.4 MG/DL (ref 0–1.2)
BUN SERPL-MCNC: 14 MG/DL (ref 6–24)
BUN/CREAT SERPL: 19 (ref 9–23)
CALCIUM SERPL-MCNC: 8.8 MG/DL (ref 8.7–10.2)
CHLORIDE SERPL-SCNC: 103 MMOL/L (ref 96–106)
CHOLEST SERPL-MCNC: 235 MG/DL (ref 100–199)
CO2 SERPL-SCNC: 22 MMOL/L (ref 20–29)
CREAT SERPL-MCNC: 0.73 MG/DL (ref 0.57–1)
EGFR: 101 ML/MIN/1.73
EOSINOPHIL # BLD AUTO: 0.1 X10E3/UL (ref 0–0.4)
EOSINOPHIL NFR BLD AUTO: 1 %
ERYTHROCYTE [DISTWIDTH] IN BLOOD BY AUTOMATED COUNT: 15.3 % (ref 11.7–15.4)
EST. AVERAGE GLUCOSE BLD GHB EST-MCNC: 120 MG/DL
GLOBULIN SER CALC-MCNC: 3 G/DL (ref 1.5–4.5)
GLUCOSE SERPL-MCNC: 95 MG/DL (ref 65–99)
HBA1C MFR BLD: 5.8 % (ref 4.8–5.6)
HCT VFR BLD AUTO: 40.6 % (ref 34–46.6)
HDLC SERPL-MCNC: 48 MG/DL
HGB BLD-MCNC: 12.9 G/DL (ref 11.1–15.9)
IMM GRANULOCYTES # BLD AUTO: 0 X10E3/UL (ref 0–0.1)
IMM GRANULOCYTES NFR BLD AUTO: 0 %
IMP & REVIEW OF LAB RESULTS: NORMAL
INTERPRETATION: NORMAL
LDLC SERPL CALC-MCNC: 159 MG/DL (ref 0–99)
LYMPHOCYTES # BLD AUTO: 2.2 X10E3/UL (ref 0.7–3.1)
LYMPHOCYTES NFR BLD AUTO: 29 %
MCH RBC QN AUTO: 24.6 PG (ref 26.6–33)
MCHC RBC AUTO-ENTMCNC: 31.8 G/DL (ref 31.5–35.7)
MCV RBC AUTO: 77 FL (ref 79–97)
MONOCYTES # BLD AUTO: 0.7 X10E3/UL (ref 0.1–0.9)
MONOCYTES NFR BLD AUTO: 9 %
NEUTROPHILS # BLD AUTO: 4.5 X10E3/UL (ref 1.4–7)
NEUTROPHILS NFR BLD AUTO: 60 %
PLATELET # BLD AUTO: 309 X10E3/UL (ref 150–450)
POTASSIUM SERPL-SCNC: 4.4 MMOL/L (ref 3.5–5.2)
PROT SERPL-MCNC: 7.2 G/DL (ref 6–8.5)
RBC # BLD AUTO: 5.25 X10E6/UL (ref 3.77–5.28)
SODIUM SERPL-SCNC: 140 MMOL/L (ref 134–144)
SPECIMEN STATUS REPORT, ROLRST: NORMAL
T4 FREE SERPL-MCNC: 0.97 NG/DL (ref 0.82–1.77)
TRIGL SERPL-MCNC: 154 MG/DL (ref 0–149)
TSH SERPL DL<=0.005 MIU/L-ACNC: 3.6 UIU/ML (ref 0.45–4.5)
VLDLC SERPL CALC-MCNC: 28 MG/DL (ref 5–40)
WBC # BLD AUTO: 7.6 X10E3/UL (ref 3.4–10.8)

## 2022-03-15 DIAGNOSIS — E55.9 VITAMIN D DEFICIENCY: ICD-10-CM

## 2022-03-15 RX ORDER — ERGOCALCIFEROL 1.25 MG/1
50000 CAPSULE ORAL
Qty: 12 CAPSULE | Refills: 1 | Status: SHIPPED | OUTPATIENT
Start: 2022-03-15

## 2022-03-15 NOTE — PROGRESS NOTES
Please let her know: Her TFTs and CMP are WNL. Her CBC is WNL. Her total cholesterol is 235. Triglycerides are 154. HDL is 48. LDL is 159. Her A1C is up to 5.8 from 5.3. Her vitamin D level is 23.6. Please let her know that she has a new diagnosis of prediabetes. Please schedule her for an apt to discuss. Meanwhile, she needs to take prescription strength vitamin D.      Dr. Carina Mccoy  [facility] [address], [address] [place] [address] Str.  Phone: [phone]  Fax: [phone]

## 2022-03-16 ENCOUNTER — TELEPHONE (OUTPATIENT)
Dept: INTERNAL MEDICINE CLINIC | Age: 47
End: 2022-03-16

## 2022-03-16 NOTE — PROGRESS NOTES
Left message for patient to call the office. RE:  Her TFTs and CMP are WNL. Her CBC is WNL. Her total cholesterol is 235. Triglycerides are 154. HDL is 48. LDL is 159. Her A1C is up to 5.8 from 5.3. Her vitamin D level is 23.6. Please let her know that she has a new diagnosis of prediabetes. Please schedule her for an apt to discuss.  Meanwhile, she needs to take prescription strength vitamin D.

## 2022-03-16 NOTE — TELEPHONE ENCOUNTER
Lucina Rock LPN   3/60/5567 32:27 PM EDT Back to Top        Left message for patient to call the office.     RE:  Her TFTs and CMP are WNL. Her CBC is WNL. Her total cholesterol is 235. Triglycerides are 154. HDL is 48. LDL is 159. Her A1C is up to 5.8 from 5.3. Her vitamin D level is 23.6.  Please let her know that she has a new diagnosis of prediabetes. Please schedule her for an apt to discuss.  Meanwhile, she needs to take prescription strength vitamin D.

## 2022-03-16 NOTE — TELEPHONE ENCOUNTER
----- Message from Xavier Wiggins MD sent at 3/15/2022  7:22 PM EDT -----  Please let her know: Her TFTs and CMP are WNL. Her CBC is WNL. Her total cholesterol is 235. Triglycerides are 154. HDL is 48. LDL is 159. Her A1C is up to 5.8 from 5.3. Her vitamin D level is 23.6. Please let her know that she has a new diagnosis of prediabetes. Please schedule her for an apt to discuss. Meanwhile, she needs to take prescription strength vitamin D.      Dr. Omar Du  [facility], [address] [place] [address], [address] [place] [address] Str.  Phone: [phone]  Fax: [phone]

## 2022-03-18 PROBLEM — K75.81 NASH (NONALCOHOLIC STEATOHEPATITIS): Status: ACTIVE | Noted: 2020-02-23

## 2022-03-18 PROBLEM — E66.01 SEVERE OBESITY (HCC): Status: ACTIVE | Noted: 2019-06-28

## 2022-03-18 PROBLEM — H90.5 SENSORINEURAL HEARING LOSS (SNHL) OF LEFT EAR: Status: ACTIVE | Noted: 2021-03-08

## 2022-03-19 PROBLEM — J30.9 ALLERGIC RHINITIS: Status: ACTIVE | Noted: 2020-02-23

## 2022-03-19 PROBLEM — E78.5 HYPERLIPIDEMIA: Status: ACTIVE | Noted: 2021-03-08

## 2022-03-19 PROBLEM — K27.9 PUD (PEPTIC ULCER DISEASE): Status: ACTIVE | Noted: 2020-02-23

## 2022-03-19 PROBLEM — F32.A DEPRESSIVE DISORDER: Status: ACTIVE | Noted: 2018-12-26

## 2022-03-20 PROBLEM — E55.9 VITAMIN D DEFICIENCY: Status: ACTIVE | Noted: 2020-02-23

## 2022-04-13 DIAGNOSIS — F41.9 ANXIETY: ICD-10-CM

## 2022-04-13 RX ORDER — SERTRALINE HYDROCHLORIDE 100 MG/1
100 TABLET, FILM COATED ORAL DAILY
Qty: 90 TABLET | Refills: 1 | Status: SHIPPED | OUTPATIENT
Start: 2022-04-13 | End: 2022-10-18

## 2022-04-13 NOTE — TELEPHONE ENCOUNTER
Pharmacy is requesting to dispense a 90 day supply    Requested Prescriptions     Pending Prescriptions Disp Refills    sertraline (ZOLOFT) 100 mg tablet 90 Tablet 1     Sig: Take 1 Tablet by mouth daily.

## 2022-05-04 ENCOUNTER — VIRTUAL VISIT (OUTPATIENT)
Dept: INTERNAL MEDICINE CLINIC | Age: 47
End: 2022-05-04
Payer: COMMERCIAL

## 2022-05-04 DIAGNOSIS — E78.5 HYPERLIPIDEMIA, UNSPECIFIED HYPERLIPIDEMIA TYPE: ICD-10-CM

## 2022-05-04 DIAGNOSIS — E55.9 VITAMIN D DEFICIENCY: ICD-10-CM

## 2022-05-04 DIAGNOSIS — E03.9 HYPOTHYROIDISM, UNSPECIFIED TYPE: ICD-10-CM

## 2022-05-04 DIAGNOSIS — R73.03 PREDIABETES: Primary | ICD-10-CM

## 2022-05-04 PROCEDURE — 99214 OFFICE O/P EST MOD 30 MIN: CPT | Performed by: INTERNAL MEDICINE

## 2022-05-04 NOTE — PROGRESS NOTES
Rosalina Lee is a 48 y.o. female who was seen by synchronous (real-time) audio-video technology on 5/4/2022. Assessment & Plan:   1. Vitamin D deficiency:   She is to continue taking prescription strength vitamin D, which was prescribed in between appointments due to her low vitamin D level.  I will recheck her vitamin D level in 6 months. 2.  Hypothyroidism: Stable.  I will check her TFTs in 6 months.  Continue with medication as prescribed. 3.  Prediabetes/Obesity:   We discussed pharmacotherapy options to help with weight reduction and prediabetes. She will let me know if she is interested in these options.  I encouraged her to reduce her carbs.  We will check an A1c in 6 months. 4.  Hyperlipidemia:   I encouraged her to maintain a plant-based diet.  We will recheck her lipid panel in 6 months. Lab review: labs are reviewed in the EHR and ordered as mentioned above. I have discussed the diagnosis with the patient and the intended plan as seen in the above orders. I have discussed medication side effects and warnings with the patient as well. I have reviewed the plan of care with the patient, accepted their input and they are in agreement with the treatment goals. All questions were answered. The patient understands the plan of care. Pt instructed if symptoms worsen to call the office or report to the ED for continued care. Greater than 50% of the visit time was spent in counseling and/or coordination of care. Voice recognition was used to generate this report, which may have resulted in some phonetic based errors in grammar and contents. Even though attempts were made to correct all the mistakes, some may have been missed, and remained in the body of the document.       Subjective:   Rosalina Lee was seen for   Chief Complaint   Patient presents with    Follow-up     The pt is a 46yo female with h/o CHRISTIAN, PUD, vitamin D Deficiency, hearing loss (left sided from Meniere's disease). allergic rhinitis (followed by Mya Blanchard, getting allergy shots), depression/anxiety, lap band surgery, gastric bypass surgery, prediabetes, obesity, OA, PCOS, and hypothyroidism.     1.  Hypothyroidism: Her most recent TFTs are normal on Synthroid 112 mcg daily. 2.  Hyperlipidemia: Her most recent labs show that her total cholesterol was 235. Her triglycerides are 154. LDL is 159. Her HDL is 48. She is not on any medication to lower her cholesterol. 3.  Prediabetes: Her most recent labs show that she has prediabetes with her A1c measuring 5.8. This is a new diagnosis for her. 4.  Vitamin D deficiency: Her most recent labs show that her vitamin D level is 23.6. Her calcium is normal.  Her kidney function is normal.       Prior to Admission medications    Medication Sig Start Date End Date Taking? Authorizing Provider   sertraline (ZOLOFT) 100 mg tablet Take 1 Tablet by mouth daily. 4/13/22   Martha Davis MD   ergocalciferol (ERGOCALCIFEROL) 1,250 mcg (50,000 unit) capsule Take 1 Capsule by mouth every seven (7) days. 3/15/22   Martha Davis MD   CALCIUM PO Take  by mouth. Provider, Historical   levothyroxine (SYNTHROID) 112 mcg tablet Take 1 Tab by mouth Daily (before breakfast). 3/8/21   Martha Davis MD   fluticasone propionate [facility]) 50 mcg/actuation nasal spray SPRAY 2 SPRAYS INTO EACH NOSTRIL EVERY DAY 8/24/20   Martha Davis MD   cholecalciferol (VITAMIN D3) 2,000 unit cap capsule Take 2,000 Units by mouth two (2) times a day. 7/2/19   Lassiter, Philbert Dubin, NP   OTHER Take  by mouth daily. Indications: tesbo vitamin for bariatric    Provider, Historical   cetirizine (ZYRTEC) 10 mg tablet TAKE 1 TABLET (10 MG TOTAL) BY MOUTH DAILY 12/22/18   Provider, Historical   cyanocobalamin (VITAMIN B-12) 1,000 mcg sublingual tablet Take 1,000 mcg by mouth daily. Provider, Historical   b complex vitamins tablet Take 1 Tab by mouth daily. Provider, Historical     Allergies   Allergen Reactions    Codeine Nausea and Vomiting and Other (comments)     dizziness     Past Medical History:   Diagnosis Date    Anxiety     Arthritis     Depression     Gastric bypass status for obesity 2016    gestational diabetes     Hypothyroidism     Morbid obesity ([facility] [address]     Morbid obesity with BMI of 40.0-44.9, adult (HCC)     Nausea & vomiting     PCOS (polycystic ovarian syndrome)     no medication as of 10/16    Psychiatric disorder     depression    Seasonal allergic rhinitis      Past Surgical History:   Procedure Laterality Date    HX GI  march 2010    lap band    HX GI  07/2016    removal of gastric band    HX GYN      dx laparoscopy    HX KNEE ARTHROSCOPY Left     x 3    LAP GASTRIC BYPASS/KRYS-EN-Y      11/15/2016     Family History   Problem Relation Age of Onset    OSTEOARTHRITIS Mother     Asthma Mother     Heart Disease Father     Diabetes Father     No Known Problems Sister     Diabetes Maternal Grandfather      Social History     Socioeconomic History    Marital status:    Tobacco Use    Smoking status: Never Smoker    Smokeless tobacco: Never Used   Substance and Sexual Activity    Alcohol use: Yes     Alcohol/week: 1.0 - 2.0 standard drink     Types: 1 - 2 Glasses of wine per week    Drug use: No    Sexual activity: Yes     Partners: Male       ROS:  Gen: No fever/chills  HEENT: No sore throat, eye pain, ear pain, or congestion.  No HA  CV: No CP  Resp: No cough/SOB  GI: No abdominal pain  : No hematuria/dysuria  Derm: No rash  Neuro: No new paresthesias/weakness  Musc: No new myalgias/jt pain  Psych: No depression sx      Objective:     General: alert, cooperative, no distress   Mental  status: mental status: alert, oriented to person, place, and time, normal mood, behavior, speech, dress, motor activity, and thought processes   Resp: resp: normal effort and no respiratory distress   Neuro: neuro: no gross deficits Skin: skin: no discoloration or lesions of concern on visible areas     LABS:  Lab Results   Component Value Date/Time    Sodium 140 03/11/2022 12:00 AM    Potassium 4.4 03/11/2022 12:00 AM    Chloride 103 03/11/2022 12:00 AM    CO2 22 03/11/2022 12:00 AM    Anion gap 6 02/19/2019 09:03 AM    Glucose 95 03/11/2022 12:00 AM    BUN 14 03/11/2022 12:00 AM    Creatinine 0.73 03/11/2022 12:00 AM    BUN/Creatinine ratio 19 03/11/2022 12:00 AM    GFR est  03/01/2021 12:00 AM    GFR est non-AA 95 03/01/2021 12:00 AM    Calcium 8.8 03/11/2022 12:00 AM       Lab Results   Component Value Date/Time    Cholesterol, total 235 (H) 03/11/2022 12:00 AM    HDL Cholesterol 48 03/11/2022 12:00 AM    LDL-CHOLESTEROL 158 (H) 03/01/2021 12:00 AM    LDL, calculated 159 (H) 03/11/2022 12:00 AM    LDL, calculated 144.6 (H) 02/19/2019 09:03 AM    VLDL, calculated 28 03/11/2022 12:00 AM    VLDL, calculated 34.4 02/19/2019 09:03 AM    Triglyceride 154 (H) 03/11/2022 12:00 AM    CHOL/HDL Ratio 4.1 02/19/2019 09:03 AM    Cholesterol/HDL ratio 4.3 03/01/2021 12:00 AM       Lab Results   Component Value Date/Time    WBC 7.6 03/11/2022 12:00 AM    HGB 12.9 03/11/2022 12:00 AM    HCT 40.6 03/11/2022 12:00 AM    PLATELET 990 95/66/7780 12:00 AM    MCV 77 (L) 03/11/2022 12:00 AM    Hgb, External 14.1 03/06/2013 12:00 AM    Hct, External 36.5 03/06/2013 12:00 AM    Platelet cnt., External 260K 03/06/2013 12:00 AM       Lab Results   Component Value Date/Time    Hemoglobin A1c 5.8 (H) 03/11/2022 12:00 AM       Lab Results   Component Value Date/Time    TSH 3.600 03/11/2022 12:00 AM    TSH, External 2.6 03/06/2013 12:00 AM           Due to this being a TeleHealth evaluation, many elements of the physical examination are unable to be assessed.      The pt was seen by synchronous (real-time) audio-video technology, and/or her healthcare decision maker, is aware that this patient-initiated, Telehealth encounter is a billable service, with coverage as determined by her insurance carrier. She is aware that she may receive a bill and has provided verbal consent to proceed: Yes. The patient (or guardian if applicable) is aware that this is a billable service, which includes applicable copays. This virtual visit was conducted with the patient's (and/or legal guardian's) consent. The pt is located in a state where the provider was licensed to provide care. The pt is being evaluated by a video visit encounter for concerns as above. A caregiver was present when appropriate. Due to this being a TeleHealth encounter (During ZYZK-31 public health emergency), evaluation of the following organ systems was limited: Vitals/Constitutional/EENT/Resp/CV/GI//MS/Neuro/Skin/Heme-Lymph-Imm. Pursuant to the emergency declaration under the [facility]5 waiver authority and the [employer] Act, this Virtual  Visit was conducted, with patient's (and/or legal guardian's) consent, to reduce the patient's risk of exposure to COVID-19 and provide necessary medical care. Services were provided through a video synchronous discussion virtually to substitute for in-person clinic visit. Patient and provider were located at their individual home/office respectively. We discussed the expected course, resolution and complications of the diagnosis(es) in detail. Medication risks, benefits, costs, interactions, and alternatives were discussed as indicated. I advised her to contact the office if her condition worsens, changes or fails to improve as anticipated. She expressed understanding with the diagnosis(es) and plan.      Erick Wright MD

## 2022-05-10 PROBLEM — R73.03 PREDIABETES: Status: ACTIVE | Noted: 2022-05-10

## 2022-06-02 ENCOUNTER — TELEPHONE (OUTPATIENT)
Dept: INTERNAL MEDICINE CLINIC | Age: 47
End: 2022-06-02

## 2022-06-02 DIAGNOSIS — E66.01 SEVERE OBESITY (HCC): Primary | ICD-10-CM

## 2022-06-02 DIAGNOSIS — R73.03 PREDIABETES: ICD-10-CM

## 2022-06-02 NOTE — TELEPHONE ENCOUNTER
Patient is calling stating she has thought about it and decided she would like to go on the medication for prediabetes that was discussed at her last appt. She would like that to be sent to [facility] in [place].

## 2022-06-06 NOTE — TELEPHONE ENCOUNTER
Please let her know that I am ordering Ozempic. If this medication is too expensive, please have her notify our office as you may be a another medicine in this drug class that is covered by her insurance. Please have her schedule for a virtual visit with me in 6 weeks to assess how she is doing on this new medication.     Dr. Erick Toth  [facility], [address] [facility], [address] [place] [address].  Phone: [phone]  Fax: [phone]

## 2022-06-06 NOTE — TELEPHONE ENCOUNTER
LVM to return our call. Per pharmacy Ozempic not covered. Suggested Alternatives Bydureon 2 mg pen, bydureon bcise 2 mg auto inject, trulicity 8.06 FP/2.0 ml pen, metformin hcl 500 mg tablet.

## 2022-06-09 NOTE — TELEPHONE ENCOUNTER
Patient returned call, she is aware that the ozempic is not covered. OTC cost with goodrx is over $ 900. Please review alternatives below, and if not appropriate, what are next steps.

## 2022-06-14 NOTE — TELEPHONE ENCOUNTER
Please let her know that her insurance would not cover Ozempic but will cover Trulicity. I am ordering this medication in place of Ozempic.     Dr. Allison Gutierrez  [facility] [address], [address] [place] [address] Str.  Phone: [phone]  Fax: [phone]

## 2022-09-02 ENCOUNTER — APPOINTMENT (OUTPATIENT)
Dept: INTERNAL MEDICINE CLINIC | Age: 47
End: 2022-09-02

## 2022-09-03 LAB
25(OH)D3+25(OH)D2 SERPL-MCNC: 25.8 NG/ML (ref 30–100)
CHOLEST SERPL-MCNC: 249 MG/DL (ref 100–199)
EST. AVERAGE GLUCOSE BLD GHB EST-MCNC: 114 MG/DL
HBA1C MFR BLD: 5.6 % (ref 4.8–5.6)
HDLC SERPL-MCNC: 51 MG/DL
IMP & REVIEW OF LAB RESULTS: NORMAL
LDLC SERPL CALC-MCNC: 168 MG/DL (ref 0–99)
T4 FREE SERPL-MCNC: 1.18 NG/DL (ref 0.82–1.77)
TRIGL SERPL-MCNC: 166 MG/DL (ref 0–149)
TSH SERPL DL<=0.005 MIU/L-ACNC: 3.59 UIU/ML (ref 0.45–4.5)
VLDLC SERPL CALC-MCNC: 30 MG/DL (ref 5–40)

## 2022-09-06 NOTE — PROGRESS NOTES
I will discuss her results with her at her upcoming appointment. Her TFTs and A1c are normal.  Her total cholesterol is 249, up from 235 five months ago. Her triglycerides are up to 156 from 154. Her HDL is 51. Her LDL is 168, up from 159 five months ago. Her vitamin D level is low at 25.8. It was 23.6 in March.     Dr. Yariel Cantu  [facility] [address], [address] [place] [address] Str.  Phone: [phone]  Fax: [phone]

## 2022-09-08 NOTE — PROGRESS NOTES
Morris Rock presents today for   Chief Complaint   Patient presents with    Follow-up    Labs     9-2-2022         1. \"Have you been to the ER, urgent care clinic since your last visit? Hospitalized since your last visit? \" no    2. \"Have you seen or consulted any other health care providers outside of the [address] [place] [address] since your last visit? \" yes     3. For patients aged 39-70: Has the patient had a colonoscopy / FIT/ Cologuard? No      If the patient is female:    4. For patients aged 41-77: Has the patient had a mammogram within the past 2 years? Yes - no Care Gap present  See top three    5. For patients aged 21-65: Has the patient had a pap smear?  Yes - no Care Gap present

## 2022-09-09 ENCOUNTER — OFFICE VISIT (OUTPATIENT)
Dept: INTERNAL MEDICINE CLINIC | Age: 47
End: 2022-09-09
Payer: COMMERCIAL

## 2022-09-09 VITALS
SYSTOLIC BLOOD PRESSURE: 137 MMHG | BODY MASS INDEX: 42.59 KG/M2 | WEIGHT: 255.6 LBS | RESPIRATION RATE: 17 BRPM | HEIGHT: 65 IN | TEMPERATURE: 97.6 F | OXYGEN SATURATION: 99 % | HEART RATE: 70 BPM | DIASTOLIC BLOOD PRESSURE: 86 MMHG

## 2022-09-09 DIAGNOSIS — E03.9 HYPOTHYROIDISM, UNSPECIFIED TYPE: ICD-10-CM

## 2022-09-09 DIAGNOSIS — R73.03 PREDIABETES: ICD-10-CM

## 2022-09-09 DIAGNOSIS — E55.9 VITAMIN D DEFICIENCY: Primary | ICD-10-CM

## 2022-09-09 DIAGNOSIS — E78.5 HYPERLIPIDEMIA, UNSPECIFIED HYPERLIPIDEMIA TYPE: ICD-10-CM

## 2022-09-09 DIAGNOSIS — K75.81 NASH (NONALCOHOLIC STEATOHEPATITIS): ICD-10-CM

## 2022-09-09 PROCEDURE — 99214 OFFICE O/P EST MOD 30 MIN: CPT | Performed by: INTERNAL MEDICINE

## 2022-09-09 NOTE — PROGRESS NOTES
INTERNISTS OF [facility]:  9/9/2022, MRN: [id number]      Rosalina Patino is a 50 y.o. female and presents to clinic for Follow-up and Labs (9-2-2022)      Subjective: The pt is a 50yo female with h/o CHRISTIAN, PUD, vitamin D Deficiency, hearing loss (left sided from Meniere's disease). allergic rhinitis (followed by [facility], getting allergy shots), depression/anxiety, lap band surgery, gastric bypass surgery, prediabetes, obesity, OA, PCOS, and hypothyroidism. 1. HLD and NAFLD: Her cholesterol worsened. Her LDL is in the 160s range, up from the 150s range. She cut  out ETOH and red meat from her diet. She did not consume fried foods. She was unable to maintain a vegan diet after trying it for 2 days. She is walking routinely. Her weight is 255 pounds. 2. Prediabetes: Resolved per recent labs. Her A1c was 5.8 earlier this year. Trulicity was ordered at her last visit but it was too expensive. She was told that she would have to pay $1800 in order to get this prescription. 3. Vitamin D Deficiency: She just picked up her vitamin D prescription. She just began prescription strength vitamin D. Her most recent labs show improvement in her vitamin D level to 25.8. It was 23.6 in March. 4.  Hypothyroidism: Taking Synthroid 112 mcg daily. Asymptomatic.   Her TFTs this year are normal.      Patient Active Problem List    Diagnosis Date Noted    Prediabetes 05/10/2022    Sensorineural hearing loss (SNHL) of left ear 03/08/2021    Hyperlipidemia 03/08/2021    PUD (peptic ulcer disease) 02/23/2020    CHRISTIAN (nonalcoholic steatohepatitis) 02/23/2020    Allergic rhinitis 02/23/2020    Vitamin D deficiency 02/23/2020    Severe obesity ([address] 06/28/2019    Depressive disorder 12/26/2018    S/P bariatric surgery 11/30/2016    Intestinal malabsorption 11/30/2016    Arthritis     Anxiety     Hypothyroidism 03/24/2012    PCOS (polycystic ovarian syndrome) 03/24/2012       Current Outpatient Medications Medication Sig Dispense Refill    semaglutide (OZEMPIC) 0.25 mg or 0.5 mg/dose (2 mg/1.5 ml) subq pen Inject subcutaneously 0.25 mg every 7 days x 4 wks and then 0.5mg every 7 days thereafter 1 Box 3    sertraline (ZOLOFT) 100 mg tablet Take 1 Tablet by mouth daily. 90 Tablet 1    levothyroxine (SYNTHROID) 112 mcg tablet Take 1 Tab by mouth Daily (before breakfast). 90 Tab 3    fluticasone propionate (FLONASE) 50 mcg/actuation nasal spray SPRAY 2 SPRAYS INTO EACH NOSTRIL EVERY DAY 1 Bottle 10    cholecalciferol (VITAMIN D3) 2,000 unit cap capsule Take 2,000 Units by mouth two (2) times a day. 90 Cap 3    cetirizine (ZYRTEC) 10 mg tablet TAKE 1 TABLET (10 MG TOTAL) BY MOUTH DAILY  2    cyanocobalamin (VITAMIN B-12) 1,000 mcg sublingual tablet Take 1,000 mcg by mouth daily. ergocalciferol (ERGOCALCIFEROL) 1,250 mcg (50,000 unit) capsule Take 1 Capsule by mouth every seven (7) days. (Patient not taking: Reported on 9/9/2022) 12 Capsule 1    CALCIUM PO Take  by mouth. (Patient not taking: Reported on 9/9/2022)      OTHER Take  by mouth daily. Indications: tesbo vitamin for bariatric (Patient not taking: Reported on 9/9/2022)      b complex vitamins tablet Take 1 Tab by mouth daily.  (Patient not taking: Reported on 9/9/2022)         Allergies   Allergen Reactions    Codeine Nausea and Vomiting and Other (comments)     dizziness       Past Medical History:   Diagnosis Date    Anxiety     Arthritis     Depression     Gastric bypass status for obesity 2016    gestational diabetes     Hypothyroidism     Morbid obesity ([facility] [address]     Morbid obesity with BMI of 40.0-44.9, adult (HCC)     Nausea & vomiting     PCOS (polycystic ovarian syndrome)     no medication as of 10/16    Psychiatric disorder     depression    Seasonal allergic rhinitis        Past Surgical History:   Procedure Laterality Date    HX GI  march 2010    lap band    HX GI  07/2016    removal of gastric band    HX GYN      dx laparoscopy    HX KNEE ARTHROSCOPY Left     x 3    LAP GASTRIC BYPASS/KRYS-EN-Y      11/15/2016       Family History   Problem Relation Age of Onset    OSTEOARTHRITIS Mother     Asthma Mother     Heart Disease Father     Diabetes Father     No Known Problems Sister     Diabetes Maternal Grandfather        Social History     Tobacco Use    Smoking status: Never    Smokeless tobacco: Never   Substance Use Topics    Alcohol use: Yes     Alcohol/week: 1.0 - 2.0 standard drink     Types: 1 - 2 Glasses of wine per week       ROS   Review of Systems   Constitutional:  Negative for chills and fever. HENT:  Negative for ear pain and sore throat. Eyes:  Negative for blurred vision and pain. Respiratory:  Negative for cough and shortness of breath. Cardiovascular:  Negative for chest pain. Gastrointestinal:  Negative for abdominal pain, blood in stool and melena. Genitourinary:  Negative for dysuria and hematuria. Musculoskeletal:  Negative for joint pain and myalgias. Skin:  Negative for rash. Neurological:  Negative for headaches. Endo/Heme/Allergies:  Does not bruise/bleed easily. Psychiatric/Behavioral:  Negative for substance abuse. Objective     Vitals:    09/09/22 0856 09/09/22 0858   BP: (!) 158/96 137/86   Pulse: 71 70   Resp: 18 17   Temp: 97.6 °F (36.4 °C)    TempSrc: Temporal    SpO2: 98% 99%   Weight: 255 lb 9.6 oz (115.9 kg)    Height: 5' 5\" (1.651 m)        Physical Exam  Vitals and nursing note reviewed. HENT:      Head: Normocephalic and atraumatic. Right Ear: External ear normal.      Left Ear: External ear normal.   Eyes:      General: No scleral icterus. Right eye: No discharge. Left eye: No discharge. Conjunctiva/sclera: Conjunctivae normal.   Cardiovascular:      Rate and Rhythm: Normal rate and regular rhythm. Heart sounds: Normal heart sounds. No murmur heard. No friction rub. No gallop.    Pulmonary:      Effort: Pulmonary effort is normal. No respiratory distress. Breath sounds: Normal breath sounds. No wheezing or rales. Abdominal:      General: Bowel sounds are normal. There is no distension. Palpations: Abdomen is soft. There is no mass. Tenderness: There is no abdominal tenderness. There is no guarding or rebound. Musculoskeletal:         General: No swelling (BUE) or tenderness (BUE). Cervical back: Neck supple. Lymphadenopathy:      Cervical: No cervical adenopathy. Skin:     General: Skin is warm and dry. Findings: No erythema or rash. Neurological:      Mental Status: She is alert. Motor: No abnormal muscle tone.       Gait: Gait normal.   Psychiatric:         Mood and Affect: Mood normal.       LABS   Data Review:   Lab Results   Component Value Date/Time    WBC 7.6 03/11/2022 12:00 AM    HGB 12.9 03/11/2022 12:00 AM    HCT 40.6 03/11/2022 12:00 AM    PLATELET 713 30/64/9601 12:00 AM    MCV 77 (L) 03/11/2022 12:00 AM    Hgb, External 14.1 03/06/2013 12:00 AM    Hct, External 36.5 03/06/2013 12:00 AM    Platelet cnt., External 260K 03/06/2013 12:00 AM       Lab Results   Component Value Date/Time    Sodium 140 03/11/2022 12:00 AM    Potassium 4.4 03/11/2022 12:00 AM    Chloride 103 03/11/2022 12:00 AM    CO2 22 03/11/2022 12:00 AM    Anion gap 6 02/19/2019 09:03 AM    Glucose 95 03/11/2022 12:00 AM    BUN 14 03/11/2022 12:00 AM    Creatinine 0.73 03/11/2022 12:00 AM    BUN/Creatinine ratio 19 03/11/2022 12:00 AM    GFR est  03/01/2021 12:00 AM    GFR est non-AA 95 03/01/2021 12:00 AM    Calcium 8.8 03/11/2022 12:00 AM       Lab Results   Component Value Date/Time    Cholesterol, total 249 (H) 09/02/2022 09:50 AM    HDL Cholesterol 51 09/02/2022 09:50 AM    LDL-CHOLESTEROL 158 (H) 03/01/2021 12:00 AM    LDL, calculated 168 (H) 09/02/2022 09:50 AM    LDL, calculated 144.6 (H) 02/19/2019 09:03 AM    VLDL, calculated 30 09/02/2022 09:50 AM    VLDL, calculated 34.4 02/19/2019 09:03 AM    Triglyceride 166 (H) 09/02/2022 09:50 AM    CHOL/HDL Ratio 4.1 02/19/2019 09:03 AM    Cholesterol/HDL ratio 4.3 03/01/2021 12:00 AM       Lab Results   Component Value Date/Time    Hemoglobin A1c 5.6 09/02/2022 09:50 AM       Assessment/Plan:   1. Prediabetes: Her A1c is within normal limits despite her history of prediabetes. -Ordering Ozempic. I instructed the patient to go to the drug website and sign up for the savings card. - I will have her return to clinic in 6 months. We will check labs at that time. ORDERS:  - semaglutide (OZEMPIC) 0.25 mg or 0.5 mg/dose (2 mg/1.5 ml) subq pen; Inject subcutaneously 0.25 mg every 7 days x 4 wks and then 0.5mg every 7 days thereafter  Dispense: 1 Box; Refill: 3    2. Hypothyroidism: Stable. - Continue with medication as prescribed. - Checking labs in 6 months. 3.  Vitamin D deficiency: Improved but still present. - I encouraged her to continue taking prescription strength vitamin D.  - I will check a follow-up level in 6 months. 4. HLD and NAFLD: Her LDL is 168 per recent labs. - Aggressive lifestyle modification measures were discussed today. - I encouraged her to maintain a plant-based diet to the best of her ability. - We will check labs in 6 months. Health Maintenance Due   Topic Date Due    Colorectal Cancer Screening Combo  Never done    COVID-19 Vaccine (3 - Booster for Pfizer series) 09/27/2021    Flu Vaccine (1) 09/01/2022         Lab review: labs are reviewed in the EHR and ordered as mentioned above. I have discussed the diagnosis with the patient and the intended plan as seen in the above orders. The patient has received an after-visit summary and questions were answered concerning future plans. I have discussed medication side effects and warnings with the patient as well. I have reviewed the plan of care with the patient, accepted their input and they are in agreement with the treatment goals. All questions were answered. The patient understands the plan of care. Handouts provided today with above information. Pt instructed if symptoms worsen to call the office or report to the ED for continued care. Greater than 50% of the visit time was spent in counseling and/or coordination of care. Voice recognition was used to generate this report, which may have resulted in some phonetic based errors in grammar and contents. Even though attempts were made to correct all the mistakes, some may have been missed, and remained in the body of the document.           Vero Lobo MD

## 2022-09-19 ENCOUNTER — TELEPHONE (OUTPATIENT)
Dept: INTERNAL MEDICINE CLINIC | Age: 47
End: 2022-09-19

## 2022-09-19 ENCOUNTER — PATIENT MESSAGE (OUTPATIENT)
Dept: INTERNAL MEDICINE CLINIC | Age: 47
End: 2022-09-19

## 2022-09-19 NOTE — TELEPHONE ENCOUNTER
----- Message from Shae Roche. Debbie sent at 2022  4:29 PM EDT -----  [address] [place] [address] not covered  Hi Dr. Verona Yuen,     I went to the [address] [place] [address] site and got all the information for their sabings card. I went to [facility] pharmacy and they went through all the instructions with me there, but becasue my insuarance doesn't cover ANYTHING, the savings card wont work. Im not sure how to proceed. thank you!   Gwinda Mohs

## 2022-12-20 DIAGNOSIS — E03.9 HYPOTHYROIDISM, UNSPECIFIED TYPE: ICD-10-CM

## 2022-12-20 RX ORDER — LEVOTHYROXINE SODIUM 112 UG/1
TABLET ORAL
Qty: 90 TABLET | Refills: 3 | Status: SHIPPED | OUTPATIENT
Start: 2022-12-20

## 2023-02-03 DIAGNOSIS — E03.9 HYPOTHYROIDISM, UNSPECIFIED TYPE: Primary | ICD-10-CM

## 2023-02-03 DIAGNOSIS — E78.5 HYPERLIPIDEMIA, UNSPECIFIED HYPERLIPIDEMIA TYPE: ICD-10-CM

## 2023-02-03 DIAGNOSIS — E28.2 PCOS (POLYCYSTIC OVARIAN SYNDROME): ICD-10-CM

## 2023-02-03 DIAGNOSIS — E28.2 PCOS (POLYCYSTIC OVARIAN SYNDROME): Primary | ICD-10-CM

## 2023-02-05 DIAGNOSIS — E55.9 VITAMIN D DEFICIENCY: Primary | ICD-10-CM

## 2023-03-30 ENCOUNTER — HOSPITAL ENCOUNTER (OUTPATIENT)
Facility: HOSPITAL | Age: 48
Setting detail: SPECIMEN
Discharge: HOME | End: 2023-03-30
Payer: COMMERCIAL

## 2023-03-30 DIAGNOSIS — E78.5 HYPERLIPIDEMIA, UNSPECIFIED HYPERLIPIDEMIA TYPE: ICD-10-CM

## 2023-03-30 DIAGNOSIS — E03.9 HYPOTHYROIDISM, UNSPECIFIED TYPE: ICD-10-CM

## 2023-03-30 DIAGNOSIS — E55.9 VITAMIN D DEFICIENCY: ICD-10-CM

## 2023-03-30 DIAGNOSIS — E28.2 PCOS (POLYCYSTIC OVARIAN SYNDROME): ICD-10-CM

## 2023-03-30 LAB
25(OH)D3 SERPL-MCNC: 22.3 NG/ML (ref 30–100)
ALBUMIN SERPL-MCNC: 3.4 G/DL (ref 3.4–5)
ALBUMIN/GLOB SERPL: 0.9 (ref 0.8–1.7)
ALP SERPL-CCNC: 84 U/L (ref 45–117)
ALT SERPL-CCNC: 20 U/L (ref 13–56)
ANION GAP SERPL CALC-SCNC: 5 MMOL/L (ref 3–18)
AST SERPL-CCNC: 14 U/L (ref 10–38)
BASOPHILS # BLD: 0 K/UL (ref 0–0.1)
BASOPHILS NFR BLD: 0 % (ref 0–2)
BILIRUB SERPL-MCNC: 0.3 MG/DL (ref 0.2–1)
BUN SERPL-MCNC: 14 MG/DL (ref 7–18)
BUN/CREAT SERPL: 18 (ref 12–20)
CALCIUM SERPL-MCNC: 8.6 MG/DL (ref 8.5–10.1)
CHLORIDE SERPL-SCNC: 107 MMOL/L (ref 100–111)
CHOLEST SERPL-MCNC: 237 MG/DL
CO2 SERPL-SCNC: 26 MMOL/L (ref 21–32)
CREAT SERPL-MCNC: 0.76 MG/DL (ref 0.6–1.3)
DIFFERENTIAL METHOD BLD: ABNORMAL
EOSINOPHIL # BLD: 0.2 K/UL (ref 0–0.4)
EOSINOPHIL NFR BLD: 2 % (ref 0–5)
ERYTHROCYTE [DISTWIDTH] IN BLOOD BY AUTOMATED COUNT: 15.7 % (ref 11.6–14.5)
EST. AVERAGE GLUCOSE BLD GHB EST-MCNC: 105 MG/DL
GLOBULIN SER CALC-MCNC: 3.6 G/DL (ref 2–4)
GLUCOSE SERPL-MCNC: 87 MG/DL (ref 74–99)
HBA1C MFR BLD: 5.3 % (ref 4.2–5.6)
HCT VFR BLD AUTO: 38.4 % (ref 35–45)
HDLC SERPL-MCNC: 47 MG/DL (ref 40–60)
HDLC SERPL: 5 (ref 0–5)
HGB BLD-MCNC: 11.2 G/DL (ref 12–16)
IMM GRANULOCYTES # BLD AUTO: 0 K/UL (ref 0–0.04)
IMM GRANULOCYTES NFR BLD AUTO: 0 % (ref 0–0.5)
LDLC SERPL CALC-MCNC: 147 MG/DL (ref 0–100)
LIPID PANEL: ABNORMAL
LYMPHOCYTES # BLD: 2.1 K/UL (ref 0.9–3.6)
LYMPHOCYTES NFR BLD: 29 % (ref 21–52)
MCH RBC QN AUTO: 22.5 PG (ref 24–34)
MCHC RBC AUTO-ENTMCNC: 29.2 G/DL (ref 31–37)
MCV RBC AUTO: 77.3 FL (ref 78–100)
MONOCYTES # BLD: 0.5 K/UL (ref 0.05–1.2)
MONOCYTES NFR BLD: 7 % (ref 3–10)
NEUTS SEG # BLD: 4.5 K/UL (ref 1.8–8)
NEUTS SEG NFR BLD: 62 % (ref 40–73)
NRBC # BLD: 0 K/UL (ref 0–0.01)
NRBC BLD-RTO: 0 PER 100 WBC
PLATELET # BLD AUTO: 332 K/UL (ref 135–420)
PMV BLD AUTO: 10.8 FL (ref 9.2–11.8)
POTASSIUM SERPL-SCNC: 4.1 MMOL/L (ref 3.5–5.5)
PROT SERPL-MCNC: 7 G/DL (ref 6.4–8.2)
RBC # BLD AUTO: 4.97 M/UL (ref 4.2–5.3)
SODIUM SERPL-SCNC: 138 MMOL/L (ref 136–145)
TRIGL SERPL-MCNC: 215 MG/DL
VLDLC SERPL CALC-MCNC: 43 MG/DL
WBC # BLD AUTO: 7.2 K/UL (ref 4.6–13.2)

## 2023-03-30 PROCEDURE — 36415 COLL VENOUS BLD VENIPUNCTURE: CPT

## 2023-03-30 PROCEDURE — 82306 VITAMIN D 25 HYDROXY: CPT

## 2023-03-30 PROCEDURE — 80061 LIPID PANEL: CPT

## 2023-03-30 PROCEDURE — 83036 HEMOGLOBIN GLYCOSYLATED A1C: CPT

## 2023-03-30 PROCEDURE — 80053 COMPREHEN METABOLIC PANEL: CPT

## 2023-03-30 PROCEDURE — 85025 COMPLETE CBC W/AUTO DIFF WBC: CPT

## 2023-04-06 ENCOUNTER — OFFICE VISIT (OUTPATIENT)
Age: 48
End: 2023-04-06
Payer: COMMERCIAL

## 2023-04-06 VITALS
TEMPERATURE: 98.7 F | HEIGHT: 65 IN | RESPIRATION RATE: 18 BRPM | OXYGEN SATURATION: 99 % | DIASTOLIC BLOOD PRESSURE: 87 MMHG | WEIGHT: 260 LBS | HEART RATE: 78 BPM | SYSTOLIC BLOOD PRESSURE: 138 MMHG | BODY MASS INDEX: 43.32 KG/M2

## 2023-04-06 DIAGNOSIS — E55.9 VITAMIN D DEFICIENCY: ICD-10-CM

## 2023-04-06 DIAGNOSIS — E66.01 OBESITY, CLASS III, BMI 40-49.9 (MORBID OBESITY) (HCC): ICD-10-CM

## 2023-04-06 DIAGNOSIS — Z12.11 ENCOUNTER FOR SCREENING FOR MALIGNANT NEOPLASM OF COLON: ICD-10-CM

## 2023-04-06 DIAGNOSIS — R12 HEARTBURN: ICD-10-CM

## 2023-04-06 DIAGNOSIS — E03.9 HYPOTHYROIDISM, UNSPECIFIED TYPE: ICD-10-CM

## 2023-04-06 DIAGNOSIS — D64.9 ANEMIA, UNSPECIFIED TYPE: Primary | ICD-10-CM

## 2023-04-06 DIAGNOSIS — E78.5 HYPERLIPIDEMIA, UNSPECIFIED HYPERLIPIDEMIA TYPE: ICD-10-CM

## 2023-04-06 DIAGNOSIS — K90.9 INTESTINAL MALABSORPTION, UNSPECIFIED TYPE: ICD-10-CM

## 2023-04-06 DIAGNOSIS — K75.81 NONALCOHOLIC STEATOHEPATITIS (NASH): ICD-10-CM

## 2023-04-06 PROCEDURE — 99214 OFFICE O/P EST MOD 30 MIN: CPT | Performed by: INTERNAL MEDICINE

## 2023-04-06 RX ORDER — ERGOCALCIFEROL 1.25 MG/1
50000 CAPSULE ORAL
Qty: 4 CAPSULE | Refills: 5 | Status: SHIPPED | OUTPATIENT
Start: 2023-04-06

## 2023-04-06 SDOH — ECONOMIC STABILITY: HOUSING INSECURITY
IN THE LAST 12 MONTHS, WAS THERE A TIME WHEN YOU DID NOT HAVE A STEADY PLACE TO SLEEP OR SLEPT IN A SHELTER (INCLUDING NOW)?: NO

## 2023-04-06 SDOH — ECONOMIC STABILITY: FOOD INSECURITY: WITHIN THE PAST 12 MONTHS, YOU WORRIED THAT YOUR FOOD WOULD RUN OUT BEFORE YOU GOT MONEY TO BUY MORE.: NEVER TRUE

## 2023-04-06 SDOH — ECONOMIC STABILITY: FOOD INSECURITY: WITHIN THE PAST 12 MONTHS, THE FOOD YOU BOUGHT JUST DIDN'T LAST AND YOU DIDN'T HAVE MONEY TO GET MORE.: NEVER TRUE

## 2023-04-06 SDOH — ECONOMIC STABILITY: INCOME INSECURITY: HOW HARD IS IT FOR YOU TO PAY FOR THE VERY BASICS LIKE FOOD, HOUSING, MEDICAL CARE, AND HEATING?: NOT HARD AT ALL

## 2023-04-06 NOTE — PROGRESS NOTES
Allison Perdomo presents today for   Chief Complaint   Patient presents with    Follow-up     6 mo f/u    Discuss Labs     3-30-23       1. \"Have you been to the ER, urgent care clinic since your last visit? Hospitalized since your last visit? \" no    2. \"Have you seen or consulted any other health care providers outside of the [address] [place] [address] since your last visit? \" yes     3. For patients aged 39-70: Has the patient had a colonoscopy / FIT/ Cologuard? No      If the patient is female:    4. For patients aged 41-77: Has the patient had a mammogram within the past 2 years? Yes - no Care Gap present      5. For patients aged 21-65: Has the patient had a pap smear? Yes - Care Gap present.  Most recent result on file
$99 per the drug website. She will contact me and let me know if she wants it to be sent to mail order or to a local pharmacy, using the coupon online.  - RTC in 3 months to assess her response to Contrave. 5. HLD and NAFLD: Stable. - I encouraged her to continue her weight loss journey and to continue trying to maintain a low-carb heart healthy diet. ICD-10-CM    1. Anemia, unspecified type  D64.9 Iron and TIBC     Ferritin     CBC with Auto Differential     External Referral To Gastroenterology     CBC with Auto Differential      2. Vitamin D deficiency  E55.9 ergocalciferol (ERGOCALCIFEROL) 1.25 MG (98703 UT) capsule     Vitamin D 25 Hydroxy      3. Hypothyroidism, unspecified type  E03.9 TSH + Free T4 Panel      4. Obesity, Class III, BMI 40-49.9 (morbid obesity) (HCC)  E66.01       5. Heartburn  R12 External Referral To Gastroenterology      6. Encounter for screening for malignant neoplasm of colon  Z12.11 CBC with Auto Differential     External Referral To Gastroenterology      7. Nonalcoholic steatohepatitis (BENNETT)  K75.81       8. Hyperlipidemia, unspecified hyperlipidemia type  E78.5       9. Intestinal malabsorption, unspecified type  K90.9 CBC with Auto Differential     Vitamin D 25 Hydroxy            Health Maintenance Due   Topic Date Due    Colorectal Cancer Screen  Never done    COVID-19 Vaccine (3 - Booster for Pfizer series) 06/22/2021    Cervical cancer screen  10/31/2022         Lab review: labs are reviewed in the EHR and ordered as mentioned above. I have discussed the diagnosis with the patient and the intended plan as seen in the above orders. The patient has received an after-visit summary and questions were answered concerning future plans. I have discussed medication side effects and warnings with the patient as well. I have reviewed the plan of care with the patient, accepted their input and they are in agreement with the treatment goals. All questions were answered.  The

## 2023-04-06 NOTE — PATIENT INSTRUCTIONS
Latest Reference Range & Units 03/30/23 13:52   Sodium 136 - 145 mmol/L 138   Potassium 3.5 - 5.5 mmol/L 4.1   Chloride 100 - 111 mmol/L 107   CO2 21 - 32 mmol/L 26   BUN,BUNPL 7.0 - 18 MG/DL 14   Creatinine 0.6 - 1.3 MG/DL 0.76   Bun/Cre Ratio 12 - 20   18   Anion Gap 3.0 - 18 mmol/L 5   Est, Glom Filt Rate >60 ml/min/1.73m2 >60   Glucose, Random 74 - 99 mg/dL 87   CALCIUM, SERUM, 766564 8.5 - 10.1 MG/DL 8.6   ALBUMIN/GLOBULIN RATIO 0.8 - 1.7   0.9   Total Protein 6.4 - 8.2 g/dL 7.0   LIPID PANEL -         Chol/HDL Ratio 0 - 5.0   5.0   CHOLESTEROL, TOTAL, 727292 <200 MG/ (H)   HDL Cholesterol 40 - 60 MG/DL 47   LDL Calculated 0 - 100 MG/ (H)   Triglycerides <150 MG/ (H)   VLDL Cholesterol Calculated MG/DL 43   Albumin 3.4 - 5.0 g/dL 3.4   Globulin 2.0 - 4.0 g/dL 3.6   Alk Phosphatase 45 - 117 U/L 84   ALT 13 - 56 U/L 20   AST 10 - 38 U/L 14   BILIRUBIN TOTAL 0.2 - 1.0 MG/DL 0.3   Hemoglobin A1C 4.2 - 5.6 % 5.3   eAG (mg/dL) mg/dL 105   Vit D, 25-Hydroxy 30 - 100 ng/mL 22.3 (L)   WBC 4.6 - 13.2 K/uL 7.2   RBC 4.20 - 5.30 M/uL 4.97   Hemoglobin Quant 12.0 - 16.0 g/dL 11.2 (L)   Hematocrit 35.0 - 45.0 % 38.4   MCV 78.0 - 100.0 FL 77.3 (L)   MCH 24.0 - 34.0 PG 22.5 (L)   MCHC 31.0 - 37.0 g/dL 29.2 (L)   MPV 9.2 - 11.8 FL 10.8   RDW 11.6 - 14.5 % 15.7 (H)   Platelet Count 681 - 420 K/uL 332   Absolute Mono # 0.05 - 1.2 K/UL 0.5   Eosinophils % 0 - 5 % 2   Basophils Absolute 0.0 - 0.1 K/UL 0.0   Differential Type -   AUTOMATED   Seg Neutrophils 40 - 73 % 62   Segs Absolute 1.8 - 8.0 K/UL 4.5   Lymphocytes 21 - 52 % 29   Absolute Lymph # 0.9 - 3.6 K/UL 2.1   Monocytes 3 - 10 % 7   Absolute Eos # 0.0 - 0.4 K/UL 0.2   Basophils 0 - 2 % 0   Immature Granulocytes 0.0 - 0.5 % 0   Nucleated Red Blood Cells 0  WBC  0.00 - 0.01 K/uL 0.0  0.00   Absolute Immature Granulocyte 0.00 - 0.04 K/UL 0.0   (H): Data is abnormally high  (L): Data is abnormally low

## 2023-04-07 LAB
BASOPHILS # BLD AUTO: 0 X10E3/UL (ref 0–0.2)
BASOPHILS NFR BLD AUTO: 1 %
EOSINOPHIL # BLD AUTO: 0.2 X10E3/UL (ref 0–0.4)
EOSINOPHIL NFR BLD AUTO: 2 %
ERYTHROCYTE [DISTWIDTH] IN BLOOD BY AUTOMATED COUNT: 15.7 % (ref 11.7–15.4)
FERRITIN SERPL-MCNC: 9 NG/ML (ref 15–150)
HCT VFR BLD AUTO: 39.4 % (ref 34–46.6)
HGB BLD-MCNC: 12.2 G/DL (ref 11.1–15.9)
IMM GRANULOCYTES # BLD AUTO: 0 X10E3/UL (ref 0–0.1)
IMM GRANULOCYTES NFR BLD AUTO: 0 %
IRON SATN MFR SERPL: 9 % (ref 15–55)
IRON SERPL-MCNC: 36 UG/DL (ref 27–159)
LYMPHOCYTES # BLD AUTO: 2.1 X10E3/UL (ref 0.7–3.1)
LYMPHOCYTES NFR BLD AUTO: 27 %
MCH RBC QN AUTO: 23 PG (ref 26.6–33)
MCHC RBC AUTO-ENTMCNC: 31 G/DL (ref 31.5–35.7)
MCV RBC AUTO: 74 FL (ref 79–97)
MONOCYTES # BLD AUTO: 0.5 X10E3/UL (ref 0.1–0.9)
MONOCYTES NFR BLD AUTO: 7 %
NEUTROPHILS # BLD AUTO: 5 X10E3/UL (ref 1.4–7)
NEUTROPHILS NFR BLD AUTO: 63 %
PLATELET # BLD AUTO: 300 X10E3/UL (ref 150–450)
RBC # BLD AUTO: 5.31 X10E6/UL (ref 3.77–5.28)
SPECIMEN STATUS REPORT: NORMAL
T4 FREE SERPL-MCNC: 1.04 NG/DL (ref 0.82–1.77)
TIBC SERPL-MCNC: 412 UG/DL (ref 250–450)
TSH SERPL DL<=0.005 MIU/L-ACNC: 4.65 UIU/ML (ref 0.45–4.5)
UIBC SERPL-MCNC: 376 UG/DL (ref 131–425)
WBC # BLD AUTO: 7.8 X10E3/UL (ref 3.4–10.8)

## 2023-04-07 ASSESSMENT — ENCOUNTER SYMPTOMS
COUGH: 0
EYE PAIN: 0
SHORTNESS OF BREATH: 0
ANAL BLEEDING: 0
BLOOD IN STOOL: 0
ABDOMINAL PAIN: 0
SORE THROAT: 0

## 2023-05-22 ENCOUNTER — TELEMEDICINE (OUTPATIENT)
Age: 48
End: 2023-05-22
Payer: COMMERCIAL

## 2023-05-22 ENCOUNTER — TELEPHONE (OUTPATIENT)
Age: 48
End: 2023-05-22

## 2023-05-22 DIAGNOSIS — R42 VERTIGO: Primary | ICD-10-CM

## 2023-05-22 DIAGNOSIS — E03.9 HYPOTHYROIDISM, UNSPECIFIED TYPE: ICD-10-CM

## 2023-05-22 PROCEDURE — 99214 OFFICE O/P EST MOD 30 MIN: CPT | Performed by: INTERNAL MEDICINE

## 2023-05-22 RX ORDER — PREDNISONE 20 MG/1
40 TABLET ORAL DAILY
Qty: 10 TABLET | Refills: 0 | Status: SHIPPED | OUTPATIENT
Start: 2023-05-22 | End: 2023-05-27

## 2023-05-22 RX ORDER — DIAZEPAM 5 MG/1
5 TABLET ORAL EVERY 8 HOURS PRN
Qty: 30 TABLET | Refills: 0 | Status: SHIPPED | OUTPATIENT
Start: 2023-05-22 | End: 2023-06-01

## 2023-05-22 RX ORDER — MECLIZINE HYDROCHLORIDE 25 MG/1
25 TABLET ORAL 4 TIMES DAILY PRN
Qty: 45 TABLET | Refills: 1 | Status: SHIPPED | OUTPATIENT
Start: 2023-05-22 | End: 2023-06-01

## 2023-05-22 NOTE — TELEPHONE ENCOUNTER
Pt's spouse calling on her behalf re:     Vertigo symptoms today (now) and 2 days ago. Stated diazepam 5 mg and meclizine 25 mg have helped in the past. They were prescribed by a different provider. Requesting medication or please advise    Pharmacy is [facility] [address]

## 2023-05-22 NOTE — PROGRESS NOTES
Karan Smith is a 50 y.o. female who was seen by synchronous (real-time) audio-video technology on 5/22/2023. Assessment & Plan:   1. Vertigo: Flaring up. +H/o Ménière's disease.  -Ordering a course of prednisone for her to take as needed if she develops symptoms similar to when she has had a Ménière's disease flareup.  - I encouraged her to follow-up with her ENT team.  - Ordering meclizine 25mg qid prn and Valium 0.5mg tid prn, previously prescribed for her in the past for symptomatic relief. 2.  Hypothyroidism: Her TSH is mildly elevated but her free T4 is normal.  - I will have her continue taking Synthroid 112 mcg daily for now. 3.  General:  - I encouraged her to start Contrave when she has recovered from #1. Diagnosis Orders   1. Vertigo  predniSONE (DELTASONE) 20 MG tablet    meclizine (ANTIVERT) 25 MG tablet    diazePAM (VALIUM) 5 MG tablet      2. Hypothyroidism, unspecified type                Lab review: labs are reviewed in the EHR     I have discussed the diagnosis with the patient and the intended plan as seen in the above orders. I have discussed medication side effects and warnings with the patient as well. I have reviewed the plan of care with the patient, accepted their input and they are in agreement with the treatment goals. All questions were answered. The patient understands the plan of care. Pt instructed if symptoms worsen to call the office or report to the ED for continued care. Greater than 50% of the visit time was spent in counseling and/or coordination of care. Voice recognition was used to generate this report, which may have resulted in some phonetic based errors in grammar and contents. Even though attempts were made to correct all the mistakes, some may have been missed, and remained in the body of the document.       Subjective:   Karan Smith was seen for   Chief Complaint   Patient presents with    Discuss Labs    Dizziness

## 2023-05-26 ENCOUNTER — TELEPHONE (OUTPATIENT)
Age: 48
End: 2023-05-26

## 2023-05-26 NOTE — TELEPHONE ENCOUNTER
Preet with [employer] and [address] [place] [address] calling to try to assist pt with getting Contrave    She is requesting last OV note. Please advise if okay to fax last note?     Ref# [id number]   [phone]   fax [phone]

## 2023-07-20 ENCOUNTER — HOSPITAL ENCOUNTER (OUTPATIENT)
Facility: HOSPITAL | Age: 48
Setting detail: SPECIMEN
Discharge: HOME | End: 2023-07-20
Payer: COMMERCIAL

## 2023-07-20 DIAGNOSIS — E55.9 VITAMIN D DEFICIENCY: ICD-10-CM

## 2023-07-20 DIAGNOSIS — K90.9 INTESTINAL MALABSORPTION, UNSPECIFIED TYPE: ICD-10-CM

## 2023-07-20 DIAGNOSIS — D64.9 ANEMIA, UNSPECIFIED TYPE: ICD-10-CM

## 2023-07-20 LAB
25(OH)D3 SERPL-MCNC: 47.6 NG/ML (ref 30–100)
BASOPHILS # BLD: 0 K/UL (ref 0–0.1)
BASOPHILS NFR BLD: 0 % (ref 0–2)
DIFFERENTIAL METHOD BLD: ABNORMAL
EOSINOPHIL # BLD: 0.1 K/UL (ref 0–0.4)
EOSINOPHIL NFR BLD: 2 % (ref 0–5)
ERYTHROCYTE [DISTWIDTH] IN BLOOD BY AUTOMATED COUNT: 16.2 % (ref 11.6–14.5)
HCT VFR BLD AUTO: 39.4 % (ref 35–45)
HGB BLD-MCNC: 11.6 G/DL (ref 12–16)
IMM GRANULOCYTES # BLD AUTO: 0 K/UL (ref 0–0.04)
IMM GRANULOCYTES NFR BLD AUTO: 0 % (ref 0–0.5)
LYMPHOCYTES # BLD: 1.7 K/UL (ref 0.9–3.6)
LYMPHOCYTES NFR BLD: 24 % (ref 21–52)
MCH RBC QN AUTO: 22.6 PG (ref 24–34)
MCHC RBC AUTO-ENTMCNC: 29.4 G/DL (ref 31–37)
MCV RBC AUTO: 76.7 FL (ref 78–100)
MONOCYTES # BLD: 0.5 K/UL (ref 0.05–1.2)
MONOCYTES NFR BLD: 7 % (ref 3–10)
NEUTS SEG # BLD: 4.8 K/UL (ref 1.8–8)
NEUTS SEG NFR BLD: 67 % (ref 40–73)
NRBC # BLD: 0 K/UL (ref 0–0.01)
NRBC BLD-RTO: 0 PER 100 WBC
PLATELET # BLD AUTO: 304 K/UL (ref 135–420)
PMV BLD AUTO: 11.4 FL (ref 9.2–11.8)
RBC # BLD AUTO: 5.14 M/UL (ref 4.2–5.3)
WBC # BLD AUTO: 7.1 K/UL (ref 4.6–13.2)

## 2023-07-20 PROCEDURE — 82306 VITAMIN D 25 HYDROXY: CPT

## 2023-07-20 PROCEDURE — 85025 COMPLETE CBC W/AUTO DIFF WBC: CPT

## 2023-07-20 PROCEDURE — 36415 COLL VENOUS BLD VENIPUNCTURE: CPT

## 2023-07-23 DIAGNOSIS — F41.9 ANXIETY DISORDER, UNSPECIFIED: ICD-10-CM

## 2023-07-24 RX ORDER — SERTRALINE HYDROCHLORIDE 100 MG/1
TABLET, FILM COATED ORAL
Qty: 90 TABLET | Refills: 2 | Status: SHIPPED | OUTPATIENT
Start: 2023-07-24

## 2023-07-27 ENCOUNTER — OFFICE VISIT (OUTPATIENT)
Age: 48
End: 2023-07-27
Payer: COMMERCIAL

## 2023-07-27 VITALS
SYSTOLIC BLOOD PRESSURE: 130 MMHG | RESPIRATION RATE: 16 BRPM | TEMPERATURE: 97.9 F | HEART RATE: 84 BPM | BODY MASS INDEX: 42.32 KG/M2 | HEIGHT: 65 IN | WEIGHT: 254 LBS | OXYGEN SATURATION: 99 % | DIASTOLIC BLOOD PRESSURE: 88 MMHG

## 2023-07-27 DIAGNOSIS — Z12.11 ENCOUNTER FOR SCREENING FOR MALIGNANT NEOPLASM OF COLON: ICD-10-CM

## 2023-07-27 DIAGNOSIS — E03.9 HYPOTHYROIDISM, UNSPECIFIED TYPE: ICD-10-CM

## 2023-07-27 DIAGNOSIS — E66.9 OBESITY (BMI 30-39.9): ICD-10-CM

## 2023-07-27 DIAGNOSIS — R73.03 PREDIABETES: Primary | ICD-10-CM

## 2023-07-27 DIAGNOSIS — R42 VERTIGO: ICD-10-CM

## 2023-07-27 PROCEDURE — 99214 OFFICE O/P EST MOD 30 MIN: CPT | Performed by: INTERNAL MEDICINE

## 2023-07-27 ASSESSMENT — PATIENT HEALTH QUESTIONNAIRE - PHQ9
SUM OF ALL RESPONSES TO PHQ QUESTIONS 1-9: 0
SUM OF ALL RESPONSES TO PHQ9 QUESTIONS 1 & 2: 0
9. THOUGHTS THAT YOU WOULD BE BETTER OFF DEAD, OR OF HURTING YOURSELF: 0
1. LITTLE INTEREST OR PLEASURE IN DOING THINGS: 0
6. FEELING BAD ABOUT YOURSELF - OR THAT YOU ARE A FAILURE OR HAVE LET YOURSELF OR YOUR FAMILY DOWN: 0
5. POOR APPETITE OR OVEREATING: 0
SUM OF ALL RESPONSES TO PHQ QUESTIONS 1-9: 0
8. MOVING OR SPEAKING SO SLOWLY THAT OTHER PEOPLE COULD HAVE NOTICED. OR THE OPPOSITE, BEING SO FIGETY OR RESTLESS THAT YOU HAVE BEEN MOVING AROUND A LOT MORE THAN USUAL: 0
3. TROUBLE FALLING OR STAYING ASLEEP: 0
7. TROUBLE CONCENTRATING ON THINGS, SUCH AS READING THE NEWSPAPER OR WATCHING TELEVISION: 0
SUM OF ALL RESPONSES TO PHQ QUESTIONS 1-9: 0
4. FEELING TIRED OR HAVING LITTLE ENERGY: 0
SUM OF ALL RESPONSES TO PHQ QUESTIONS 1-9: 0
2. FEELING DOWN, DEPRESSED OR HOPELESS: 0

## 2023-07-27 ASSESSMENT — ANXIETY QUESTIONNAIRES
1. FEELING NERVOUS, ANXIOUS, OR ON EDGE: 0
GAD7 TOTAL SCORE: 0
6. BECOMING EASILY ANNOYED OR IRRITABLE: 0
3. WORRYING TOO MUCH ABOUT DIFFERENT THINGS: 0
2. NOT BEING ABLE TO STOP OR CONTROL WORRYING: 0
7. FEELING AFRAID AS IF SOMETHING AWFUL MIGHT HAPPEN: 0
4. TROUBLE RELAXING: 0
5. BEING SO RESTLESS THAT IT IS HARD TO SIT STILL: 0

## 2023-08-02 ASSESSMENT — ENCOUNTER SYMPTOMS
SORE THROAT: 0
EYE PAIN: 0
ABDOMINAL PAIN: 0
ANAL BLEEDING: 0
SHORTNESS OF BREATH: 0
BLOOD IN STOOL: 0
COUGH: 0

## 2023-08-30 ENCOUNTER — HOSPITAL ENCOUNTER (OUTPATIENT)
Facility: HOSPITAL | Age: 48
Setting detail: SPECIMEN
Discharge: HOME | End: 2023-09-02
Payer: COMMERCIAL

## 2023-08-30 DIAGNOSIS — R73.03 PREDIABETES: ICD-10-CM

## 2023-08-30 DIAGNOSIS — E03.9 HYPOTHYROIDISM, UNSPECIFIED TYPE: ICD-10-CM

## 2023-08-30 LAB
EST. AVERAGE GLUCOSE BLD GHB EST-MCNC: 105 MG/DL
HBA1C MFR BLD: 5.3 % (ref 4.2–5.6)
T4 FREE SERPL-MCNC: 0.9 NG/DL (ref 0.7–1.5)
TSH SERPL DL<=0.05 MIU/L-ACNC: 2.47 UIU/ML (ref 0.36–3.74)

## 2023-08-30 PROCEDURE — 36415 COLL VENOUS BLD VENIPUNCTURE: CPT

## 2023-08-30 PROCEDURE — 84443 ASSAY THYROID STIM HORMONE: CPT

## 2023-08-30 PROCEDURE — 84439 ASSAY OF FREE THYROXINE: CPT

## 2023-08-30 PROCEDURE — 83036 HEMOGLOBIN GLYCOSYLATED A1C: CPT

## 2023-09-28 ENCOUNTER — TELEPHONE (OUTPATIENT)
Age: 48
End: 2023-09-28

## 2023-09-28 NOTE — TELEPHONE ENCOUNTER
Attempted to contact patient to inform her that her TFTs and A1C were within normal range. There was no response.  Voicemail left requesting call back

## 2024-07-18 DIAGNOSIS — K90.9 INTESTINAL MALABSORPTION, UNSPECIFIED TYPE: ICD-10-CM

## 2024-07-18 DIAGNOSIS — E03.9 HYPOTHYROIDISM, UNSPECIFIED TYPE: Primary | ICD-10-CM

## 2024-07-18 DIAGNOSIS — D64.9 ANEMIA, UNSPECIFIED TYPE: ICD-10-CM

## 2024-07-18 DIAGNOSIS — K75.81 NONALCOHOLIC STEATOHEPATITIS (NASH): ICD-10-CM

## 2024-07-18 DIAGNOSIS — R73.03 PREDIABETES: ICD-10-CM

## 2024-07-18 DIAGNOSIS — F41.9 ANXIETY DISORDER, UNSPECIFIED: ICD-10-CM

## 2024-07-18 DIAGNOSIS — E78.5 HYPERLIPIDEMIA, UNSPECIFIED HYPERLIPIDEMIA TYPE: ICD-10-CM

## 2024-07-19 RX ORDER — SERTRALINE HYDROCHLORIDE 100 MG/1
TABLET, FILM COATED ORAL
Qty: 90 TABLET | Refills: 0 | Status: SHIPPED | OUTPATIENT
Start: 2024-07-19

## 2024-07-19 NOTE — TELEPHONE ENCOUNTER
Please have her scheduled for an in office apt with me in August or September. I haven't seen her in over a year. She has not had labs in over a year. Please have her scheduled for fasting labs 1 wk before her apt.      Dr. Vero Pickens  [facility] of [address], [address]  [place] [address]  Phone: [phone]  Fax: [phone]

## 2024-10-11 RX ORDER — LEVOTHYROXINE SODIUM 112 UG/1
TABLET ORAL
Qty: 90 TABLET | Refills: 3 | OUTPATIENT
Start: 2024-10-11

## 2024-10-23 DIAGNOSIS — F41.9 ANXIETY DISORDER, UNSPECIFIED: ICD-10-CM

## 2024-10-24 RX ORDER — SERTRALINE HYDROCHLORIDE 100 MG/1
TABLET, FILM COATED ORAL
Qty: 90 TABLET | Refills: 0 | OUTPATIENT
Start: 2024-10-24

## 2024-11-22 ENCOUNTER — TELEPHONE (OUTPATIENT)
Facility: CLINIC | Age: 49
End: 2024-11-22

## 2024-11-22 NOTE — TELEPHONE ENCOUNTER
----- Message from Kannan LANDRY sent at 11/22/2024  1:19 PM EST -----  Regarding: E[facility] Appointment Request  E[facility] Appointment Request    Patient needs appointment for E[facility] Appointment Type: Annual Visit.    Patient Requested Dates(s): any day (patient didn't have a preferred date)  Patient Requested Time: any time (patient didn't have a preferred date)  Provider Name: Vero Pickens MD    Reason for Appointment Request: Established Patient - No appointments available during search. Ms. Whaley wants to schedule an appointment with Vero Pickens MD, she hasn't seen her for a while and wants to have a Pap Smear, Annual Wellness Visit, and lab works like the usual.  --------------------------------------------------------------------------------------------------------------------------    Relationship to Patient: Self     Call Back Information: OK to leave message on voicemail  Preferred Call Back Number: [phone]

## 2024-11-26 ENCOUNTER — TELEPHONE (OUTPATIENT)
Facility: CLINIC | Age: 49
End: 2024-11-26

## 2024-11-26 NOTE — TELEPHONE ENCOUNTER
Refill   Synthroid   [facility] [address]         Pt made an appt next opening is on 01/17/2025   She is asking to get a temp refill until her appt

## 2024-11-26 NOTE — TELEPHONE ENCOUNTER
Last OV: .07/27/23   Next OV: 01/15/25   Last RX: 12/202/22      Pt asking for a temp supply until OV.      RAMIN Bailey LPN

## 2024-11-27 RX ORDER — LEVOTHYROXINE SODIUM 112 UG/1
112 TABLET ORAL
Qty: 90 TABLET | Refills: 0 | Status: SHIPPED | OUTPATIENT
Start: 2024-11-27

## 2025-01-13 ENCOUNTER — HOSPITAL ENCOUNTER (OUTPATIENT)
Facility: HOSPITAL | Age: 50
Setting detail: SPECIMEN
Discharge: HOME | End: 2025-01-16
Payer: COMMERCIAL

## 2025-01-13 DIAGNOSIS — K75.81 NONALCOHOLIC STEATOHEPATITIS (NASH): ICD-10-CM

## 2025-01-13 DIAGNOSIS — E78.5 HYPERLIPIDEMIA, UNSPECIFIED HYPERLIPIDEMIA TYPE: ICD-10-CM

## 2025-01-13 DIAGNOSIS — D64.9 ANEMIA, UNSPECIFIED TYPE: ICD-10-CM

## 2025-01-13 DIAGNOSIS — R73.03 PREDIABETES: ICD-10-CM

## 2025-01-13 DIAGNOSIS — E03.9 HYPOTHYROIDISM, UNSPECIFIED TYPE: ICD-10-CM

## 2025-01-13 DIAGNOSIS — K90.9 INTESTINAL MALABSORPTION, UNSPECIFIED TYPE: ICD-10-CM

## 2025-01-13 LAB
25(OH)D3 SERPL-MCNC: 21.9 NG/ML (ref 30–100)
ALBUMIN SERPL-MCNC: 3.5 G/DL (ref 3.4–5)
ALBUMIN/GLOB SERPL: 1 (ref 0.8–1.7)
ALP SERPL-CCNC: 74 U/L (ref 45–117)
ALT SERPL-CCNC: 13 U/L (ref 13–56)
ANION GAP SERPL CALC-SCNC: 3 MMOL/L (ref 3–18)
AST SERPL-CCNC: 11 U/L (ref 10–38)
BASOPHILS # BLD: 0.03 K/UL (ref 0–0.1)
BASOPHILS NFR BLD: 0.4 % (ref 0–2)
BILIRUB SERPL-MCNC: 0.5 MG/DL (ref 0.2–1)
BUN SERPL-MCNC: 12 MG/DL (ref 7–18)
BUN/CREAT SERPL: 15 (ref 12–20)
CALCIUM SERPL-MCNC: 8.7 MG/DL (ref 8.5–10.1)
CHLORIDE SERPL-SCNC: 106 MMOL/L (ref 100–111)
CHOLEST SERPL-MCNC: 211 MG/DL
CO2 SERPL-SCNC: 28 MMOL/L (ref 21–32)
CREAT SERPL-MCNC: 0.81 MG/DL (ref 0.6–1.3)
DIFFERENTIAL METHOD BLD: ABNORMAL
EOSINOPHIL # BLD: 0.19 K/UL (ref 0–0.4)
EOSINOPHIL NFR BLD: 2.5 % (ref 0–5)
ERYTHROCYTE [DISTWIDTH] IN BLOOD BY AUTOMATED COUNT: 18.3 % (ref 11.6–14.5)
EST. AVERAGE GLUCOSE BLD GHB EST-MCNC: 97 MG/DL
GLOBULIN SER CALC-MCNC: 3.6 G/DL (ref 2–4)
GLUCOSE SERPL-MCNC: 97 MG/DL (ref 74–99)
HBA1C MFR BLD: 5 % (ref 4.2–5.6)
HCT VFR BLD AUTO: 33.7 % (ref 35–45)
HDLC SERPL-MCNC: 47 MG/DL (ref 40–60)
HDLC SERPL: 4.5 (ref 0–5)
HGB BLD-MCNC: 9.2 G/DL (ref 12–16)
IMM GRANULOCYTES # BLD AUTO: 0.04 K/UL (ref 0–0.04)
IMM GRANULOCYTES NFR BLD AUTO: 0.5 % (ref 0–0.5)
LDLC SERPL CALC-MCNC: 124.4 MG/DL (ref 0–100)
LIPID PANEL: ABNORMAL
LYMPHOCYTES # BLD: 1.77 K/UL (ref 0.9–3.6)
LYMPHOCYTES NFR BLD: 23.6 % (ref 21–52)
MCH RBC QN AUTO: 19.5 PG (ref 24–34)
MCHC RBC AUTO-ENTMCNC: 27.3 G/DL (ref 31–37)
MCV RBC AUTO: 71.2 FL (ref 78–100)
MONOCYTES # BLD: 0.56 K/UL (ref 0.05–1.2)
MONOCYTES NFR BLD: 7.5 % (ref 3–10)
NEUTS SEG # BLD: 4.91 K/UL (ref 1.8–8)
NEUTS SEG NFR BLD: 65.5 % (ref 40–73)
NRBC # BLD: 0 K/UL (ref 0–0.01)
NRBC BLD-RTO: 0 PER 100 WBC
PLATELET # BLD AUTO: 329 K/UL (ref 135–420)
PLATELET COMMENT: ABNORMAL
PMV BLD AUTO: 11 FL (ref 9.2–11.8)
POTASSIUM SERPL-SCNC: 3.8 MMOL/L (ref 3.5–5.5)
PROT SERPL-MCNC: 7.1 G/DL (ref 6.4–8.2)
RBC # BLD AUTO: 4.73 M/UL (ref 4.2–5.3)
RBC MORPH BLD: ABNORMAL
SODIUM SERPL-SCNC: 137 MMOL/L (ref 136–145)
T4 FREE SERPL-MCNC: 0.9 NG/DL (ref 0.7–1.5)
TRIGL SERPL-MCNC: 198 MG/DL
TSH SERPL DL<=0.05 MIU/L-ACNC: 3.42 UIU/ML (ref 0.36–3.74)
VIT B12 SERPL-MCNC: 250 PG/ML (ref 211–911)
VLDLC SERPL CALC-MCNC: 39.6 MG/DL
WBC # BLD AUTO: 7.5 K/UL (ref 4.6–13.2)

## 2025-01-13 PROCEDURE — 85025 COMPLETE CBC W/AUTO DIFF WBC: CPT

## 2025-01-13 PROCEDURE — 84443 ASSAY THYROID STIM HORMONE: CPT

## 2025-01-13 PROCEDURE — 82306 VITAMIN D 25 HYDROXY: CPT

## 2025-01-13 PROCEDURE — 83036 HEMOGLOBIN GLYCOSYLATED A1C: CPT

## 2025-01-13 PROCEDURE — 80061 LIPID PANEL: CPT

## 2025-01-13 PROCEDURE — 80053 COMPREHEN METABOLIC PANEL: CPT

## 2025-01-13 PROCEDURE — 36415 COLL VENOUS BLD VENIPUNCTURE: CPT

## 2025-01-13 PROCEDURE — 84439 ASSAY OF FREE THYROXINE: CPT

## 2025-01-13 PROCEDURE — 82607 VITAMIN B-12: CPT

## 2025-01-15 ENCOUNTER — OFFICE VISIT (OUTPATIENT)
Facility: CLINIC | Age: 50
End: 2025-01-15
Payer: COMMERCIAL

## 2025-01-15 VITALS
SYSTOLIC BLOOD PRESSURE: 126 MMHG | OXYGEN SATURATION: 99 % | WEIGHT: 252 LBS | RESPIRATION RATE: 20 BRPM | BODY MASS INDEX: 41.99 KG/M2 | HEART RATE: 81 BPM | DIASTOLIC BLOOD PRESSURE: 79 MMHG | HEIGHT: 65 IN | TEMPERATURE: 98.2 F

## 2025-01-15 DIAGNOSIS — M54.2 NECK PAIN: ICD-10-CM

## 2025-01-15 DIAGNOSIS — E55.9 VITAMIN D DEFICIENCY: ICD-10-CM

## 2025-01-15 DIAGNOSIS — E78.5 HYPERLIPIDEMIA, UNSPECIFIED HYPERLIPIDEMIA TYPE: ICD-10-CM

## 2025-01-15 DIAGNOSIS — Z12.31 ENCOUNTER FOR SCREENING MAMMOGRAM FOR BREAST CANCER: ICD-10-CM

## 2025-01-15 DIAGNOSIS — Z00.00 ROUTINE GENERAL MEDICAL EXAMINATION AT A HEALTH CARE FACILITY: Primary | ICD-10-CM

## 2025-01-15 DIAGNOSIS — K90.9 INTESTINAL MALABSORPTION, UNSPECIFIED TYPE: ICD-10-CM

## 2025-01-15 DIAGNOSIS — E03.9 HYPOTHYROIDISM, UNSPECIFIED TYPE: ICD-10-CM

## 2025-01-15 DIAGNOSIS — F41.9 ANXIETY DISORDER, UNSPECIFIED TYPE: ICD-10-CM

## 2025-01-15 DIAGNOSIS — D64.9 ANEMIA, UNSPECIFIED TYPE: ICD-10-CM

## 2025-01-15 PROCEDURE — 99396 PREV VISIT EST AGE 40-64: CPT | Performed by: INTERNAL MEDICINE

## 2025-01-15 RX ORDER — TRAMADOL HYDROCHLORIDE 50 MG/1
50 TABLET ORAL EVERY 6 HOURS
COMMUNITY
Start: 2024-12-30

## 2025-01-15 RX ORDER — ERGOCALCIFEROL 1.25 MG/1
50000 CAPSULE, LIQUID FILLED ORAL WEEKLY
Qty: 12 CAPSULE | Refills: 1 | Status: SHIPPED | OUTPATIENT
Start: 2025-01-15

## 2025-01-15 RX ORDER — GABAPENTIN 100 MG/1
100 CAPSULE ORAL 3 TIMES DAILY
COMMUNITY
Start: 2024-12-30

## 2025-01-15 RX ORDER — NAPROXEN 500 MG/1
500 TABLET ORAL 2 TIMES DAILY
COMMUNITY
Start: 2024-12-30 | End: 2025-01-15 | Stop reason: ALTCHOICE

## 2025-01-15 SDOH — ECONOMIC STABILITY: FOOD INSECURITY: WITHIN THE PAST 12 MONTHS, THE FOOD YOU BOUGHT JUST DIDN'T LAST AND YOU DIDN'T HAVE MONEY TO GET MORE.: NEVER TRUE

## 2025-01-15 SDOH — ECONOMIC STABILITY: FOOD INSECURITY: WITHIN THE PAST 12 MONTHS, YOU WORRIED THAT YOUR FOOD WOULD RUN OUT BEFORE YOU GOT MONEY TO BUY MORE.: NEVER TRUE

## 2025-01-15 ASSESSMENT — PATIENT HEALTH QUESTIONNAIRE - PHQ9
8. MOVING OR SPEAKING SO SLOWLY THAT OTHER PEOPLE COULD HAVE NOTICED. OR THE OPPOSITE, BEING SO FIGETY OR RESTLESS THAT YOU HAVE BEEN MOVING AROUND A LOT MORE THAN USUAL: NOT AT ALL
1. LITTLE INTEREST OR PLEASURE IN DOING THINGS: NOT AT ALL
2. FEELING DOWN, DEPRESSED OR HOPELESS: NOT AT ALL
5. POOR APPETITE OR OVEREATING: NOT AT ALL
SUM OF ALL RESPONSES TO PHQ QUESTIONS 1-9: 0
SUM OF ALL RESPONSES TO PHQ QUESTIONS 1-9: 0
3. TROUBLE FALLING OR STAYING ASLEEP: NOT AT ALL
7. TROUBLE CONCENTRATING ON THINGS, SUCH AS READING THE NEWSPAPER OR WATCHING TELEVISION: NOT AT ALL
SUM OF ALL RESPONSES TO PHQ9 QUESTIONS 1 & 2: 0
4. FEELING TIRED OR HAVING LITTLE ENERGY: NOT AT ALL
SUM OF ALL RESPONSES TO PHQ QUESTIONS 1-9: 0
6. FEELING BAD ABOUT YOURSELF - OR THAT YOU ARE A FAILURE OR HAVE LET YOURSELF OR YOUR FAMILY DOWN: NOT AT ALL
10. IF YOU CHECKED OFF ANY PROBLEMS, HOW DIFFICULT HAVE THESE PROBLEMS MADE IT FOR YOU TO DO YOUR WORK, TAKE CARE OF THINGS AT HOME, OR GET ALONG WITH OTHER PEOPLE: NOT DIFFICULT AT ALL
9. THOUGHTS THAT YOU WOULD BE BETTER OFF DEAD, OR OF HURTING YOURSELF: NOT AT ALL
SUM OF ALL RESPONSES TO PHQ QUESTIONS 1-9: 0

## 2025-01-15 NOTE — PROGRESS NOTES
Danisha Daniel Judd presents today for   Chief Complaint   Patient presents with    Annual Exam           \"Have you been to the ER, urgent care clinic since your last visit?  Hospitalized since your last visit?\"    YES - When: approximately 1 months ago.  Where and Why: shoulder and neck pain.    “Have you seen or consulted any other health care providers outside of [facility] since your last visit?”    Yes sports medicine.       Have you had a mammogram?”   NO    Date of last Mammogram: 5/26/2021      “Have you had a pap smear?”    NO    Date of last Cervical Cancer screen (HPV or PAP): 10/31/2019

## 2025-01-15 NOTE — PROGRESS NOTES
INTERNISTS OF [facility][note 3]:  1/21/2025, MRN: [id number]      Danisha Whaley is a 49 y.o. female and presents to clinic for Annual Exam      Subjective:   The pt is a 50yo female with h/o BENNETT, PUD, vitamin D Deficiency, hearing loss (left sided from Meniere's disease), allergic rhinitis (followed by , s/p allergy shots), depression/anxiety, lap band surgery, gastric bypass surgery, prediabetes, obesity, OA, PCOS, and hypothyroidism.      1. Neck Pain: She occasionally has LUE paresthesias. She occasionally has neck pain that radiates down her LUE. She is scheduled with Orthopedics for evaluation. She has neck pain since her last apt. Gabapentin makes her feel woozy and drowsy on 100mg tid. Sx were so severe that she went to the ED last month. I reviewed the ED provider note. She tried treatment with a chiropractor. She is scheduled for an MRI. She had a neck injection and is not sure if it helped to relieve her sx since she had it done around the time she started gabapentin.    Cervical spine xray: No acute findings. No significant appreciable neural foraminal bony encroachment. Anterolisthesis C4 on C5, 2 to 3 mm     2. Depression/Anxiety: She's been on zoloft for a \"long time.\" She's been on zoloft for 15 yrs. she believes her dose of 100 mg daily of Zoloft is working well at this time.    3. General:  - Allergy sx are well controlled with zyrtec 10mg daily.   - Overdue for a mammogram  - ETOH: yes. She drinks once a weekend 3-4 glasses of wine and then 2 days during the wk  - She is overdue to get a PAP. Menses are regular. +PCOS.   - Her vitamin D is low per January labs  - She is interested in weight loss assistance.     4. HLD: No on HLD rx.      Latest Reference Range & Units 03/30/23 13:52 01/13/25 10:40   Cholesterol, Total <200 MG/ (H) 211 (H)   HDL Cholesterol 40 - 60 MG/DL 47 47   LDL Cholesterol 0 - 100 MG/ (H) 124.4 (H)   Triglycerides <150 MG/ (H) 198 (H)   VLDL

## 2025-01-21 LAB — HEMOCCULT STL QL IA: POSITIVE

## 2025-01-22 ENCOUNTER — TELEPHONE (OUTPATIENT)
Facility: CLINIC | Age: 50
End: 2025-01-22

## 2025-01-22 DIAGNOSIS — Z98.84 HX OF BARIATRIC SURGERY: ICD-10-CM

## 2025-01-22 DIAGNOSIS — D64.9 ANEMIA, UNSPECIFIED TYPE: Primary | ICD-10-CM

## 2025-01-22 DIAGNOSIS — K90.9 INTESTINAL MALABSORPTION, UNSPECIFIED TYPE: ICD-10-CM

## 2025-01-22 DIAGNOSIS — R19.5 POSITIVE FECAL OCCULT BLOOD TEST: ICD-10-CM

## 2025-01-22 DIAGNOSIS — Z84.89 FAMILY HISTORY OF BARIATRIC SURGERY: ICD-10-CM

## 2025-01-22 NOTE — TELEPHONE ENCOUNTER
----- Message from Dr. Vero Pickens MD sent at 1/22/2025 11:50 AM EST -----  Please let her know that her occult blood stool test is positive.  I am placing a referral to GI for evaluation of intestinal loss of blood.  She should avoid all NSAIDs including Motrin.  She should continue use of Prilosec 20 mg daily pending her apt with GI.     Dr. Vero Pickens  [facility] of [address], [address]  [place] [address]  Phone: [phone]  Fax: [phone]

## 2025-01-29 LAB
BASOPHILS # BLD AUTO: 0 X10E3/UL (ref 0–0.2)
BASOPHILS NFR BLD AUTO: 0 %
EOSINOPHIL # BLD AUTO: 0.1 X10E3/UL (ref 0–0.4)
EOSINOPHIL NFR BLD AUTO: 1 %
ERYTHROCYTE [DISTWIDTH] IN BLOOD BY AUTOMATED COUNT: 16.9 % (ref 11.7–15.4)
FERRITIN SERPL-MCNC: 5 NG/ML (ref 15–150)
HCT VFR BLD AUTO: 34.8 % (ref 34–46.6)
HGB BLD-MCNC: 9.5 G/DL (ref 11.1–15.9)
IMM GRANULOCYTES # BLD AUTO: 0 X10E3/UL (ref 0–0.1)
IMM GRANULOCYTES NFR BLD AUTO: 0 %
IRON SATN MFR SERPL: 4 % (ref 15–55)
IRON SERPL-MCNC: 16 UG/DL (ref 27–159)
LYMPHOCYTES # BLD AUTO: 2 X10E3/UL (ref 0.7–3.1)
LYMPHOCYTES NFR BLD AUTO: 31 %
MCH RBC QN AUTO: 18.9 PG (ref 26.6–33)
MCHC RBC AUTO-ENTMCNC: 27.3 G/DL (ref 31.5–35.7)
MCV RBC AUTO: 69 FL (ref 79–97)
MONOCYTES # BLD AUTO: 0.3 X10E3/UL (ref 0.1–0.9)
MONOCYTES NFR BLD AUTO: 5 %
NEUTROPHILS # BLD AUTO: 4.2 X10E3/UL (ref 1.4–7)
NEUTROPHILS NFR BLD AUTO: 63 %
PLATELET # BLD AUTO: 465 X10E3/UL (ref 150–450)
RBC # BLD AUTO: 5.02 X10E6/UL (ref 3.77–5.28)
TIBC SERPL-MCNC: 424 UG/DL (ref 250–450)
UIBC SERPL-MCNC: 408 UG/DL (ref 131–425)
WBC # BLD AUTO: 6.7 X10E3/UL (ref 3.4–10.8)

## 2025-02-11 DIAGNOSIS — D64.9 ANEMIA, UNSPECIFIED TYPE: ICD-10-CM

## 2025-02-13 ENCOUNTER — OFFICE VISIT (OUTPATIENT)
Facility: CLINIC | Age: 50
End: 2025-02-13
Payer: COMMERCIAL

## 2025-02-13 VITALS
TEMPERATURE: 98.5 F | RESPIRATION RATE: 18 BRPM | HEART RATE: 88 BPM | DIASTOLIC BLOOD PRESSURE: 88 MMHG | BODY MASS INDEX: 41.19 KG/M2 | WEIGHT: 247.2 LBS | OXYGEN SATURATION: 98 % | HEIGHT: 65 IN | SYSTOLIC BLOOD PRESSURE: 130 MMHG

## 2025-02-13 DIAGNOSIS — K27.9 PUD (PEPTIC ULCER DISEASE): Primary | ICD-10-CM

## 2025-02-13 DIAGNOSIS — E61.1 IRON DEFICIENCY: ICD-10-CM

## 2025-02-13 DIAGNOSIS — E66.01 OBESITY, MORBID (MORE THAN 100 LBS OVER IDEAL WEIGHT OR BMI > 40): ICD-10-CM

## 2025-02-13 PROCEDURE — 99214 OFFICE O/P EST MOD 30 MIN: CPT | Performed by: INTERNAL MEDICINE

## 2025-02-13 RX ORDER — MAGNESIUM 200 MG
TABLET ORAL
COMMUNITY

## 2025-02-13 RX ORDER — M-VIT,TX,IRON,MINS/CALC/FOLIC 27MG-0.4MG
1 TABLET ORAL DAILY
COMMUNITY

## 2025-02-13 RX ORDER — PANTOPRAZOLE SODIUM 40 MG/1
40 TABLET, DELAYED RELEASE ORAL
Qty: 30 TABLET | Refills: 5 | Status: SHIPPED | OUTPATIENT
Start: 2025-02-13

## 2025-02-13 RX ORDER — IBUPROFEN 200 MG
1 CAPSULE ORAL DAILY
COMMUNITY

## 2025-02-13 NOTE — PROGRESS NOTES
Danisha Whaley presents today for   Chief Complaint   Patient presents with    Pre-op Exam     02/24; Cervical fusion; Dr. De La Cruz; [facility]           \"Have you been to the ER, urgent care clinic since your last visit?  Hospitalized since your last visit?\"    NO    “Have you seen or consulted any other health care providers outside of [facility] since your last visit?”    YES - When: approximately 1 months ago.  [facility].       Have you had a mammogram?”   NO    Date of last Mammogram: 5/26/2021      “Have you had a pap smear?”    NO    Date of last Cervical Cancer screen (HPV or PAP): 10/31/2019

## 2025-02-13 NOTE — PROGRESS NOTES
INTERNISTS OF [facility]:  2/19/2025, MRN: [id number]      Danisha Whaley is a 49 y.o. female and presents to clinic for Pre-op Exam (02/24; Cervical fusion; Dr. De La Cruz; [facility])      Subjective:   The pt is a 50yo female with h/o BENNETT, PUD, vitamin D Deficiency, hearing loss (left sided from Meniere's disease), allergic rhinitis (followed by , s/p allergy shots), depression/anxiety, lap band surgery, gastric bypass surgery, prediabetes, obesity, OA, PCOS, and hypothyroidism.      1. PUD and Iron Deficiency: She has epigastric pain and feels food gets stuck in her stomach. She had a positive FOBT. Her H/H is down to 8.6 per [facility] labs. She is scheduled April 1st with [facility]. No melena but she had some last summer after having an alcoholic beverage. No rectal bleeding. She is avoiding NSAIDs since her last apt. She drinks a glass with dinner twice a wk. \"I'm tired all the time.\" She takes on iron tab per wk. Her post op labs suggest new blood loss.  On protonix 40mg daily. +H/o weight loss surgery.      Latest Reference Range & Units 01/15/25 00:00 01/28/25 00:00   Ferritin 15 - 150 ng/mL  5 (L)   Iron 27 - 159 ug/dL  16 (L)   UIBC 131 - 425 ug/dL  408   TIBC 250 - 450 ug/dL  424   Iron % Saturation 15 - 55 %  4 (LL)   Occult Blood Fecal Negative  Positive !    (LL): Data is critically low  (L): Data is abnormally low  !: Data is abnormal    2. Obesity: BMI is 41. Weight is 247lbs. She tried contrave in the past for weight loss.     3. General: She is having worsening spine related pain. Her surgery will be postponed for later this month due to her decline in her H/H w/I the past month.       Patient Active Problem List    Diagnosis Date Noted    Prediabetes 05/10/2022    Sensorineural hearing loss (SNHL) of left ear 03/08/2021    Hyperlipidemia 03/08/2021    BENNETT (nonalcoholic steatohepatitis) 02/23/2020    Allergic rhinitis 02/23/2020    PUD (peptic ulcer disease) 02/23/2020    Vitamin D

## 2025-02-17 ENCOUNTER — TELEPHONE (OUTPATIENT)
Dept: PHARMACY | Facility: CLINIC | Age: 50
End: 2025-02-17

## 2025-02-17 NOTE — TELEPHONE ENCOUNTER
**[facility] the [facility]**    Attempt made to contact patient at the home number.    Spoke to patient at home number and advised them of the previous message.    Patient verified understanding and scheduled a in person appointment .  Appointment scheduled for 3/21/25 at 8:00 am with Karel Huang  .    [facility]   Ambulatory Pharmacy Clinical   [phone]  Department, toll free: [phone], option 2        For Pharmacy Admin Tracking Only    Program: Medical Group  Recommendation Provided To: Patient/Caregiver: 1 via Telephone  Intervention Detail: Scheduled Appointment  Intervention Accepted By: Patient/Caregiver: 1  Gap Closed?: Yes   Time Spent (min): 10

## 2025-02-17 NOTE — TELEPHONE ENCOUNTER
Reason for referral: Comprehensive Medication Review  Referring provider: Dr Pickens   Referring provider office: [facility] of [facility]  Referred to: Karel Huang, PharmD, BCACP, BC-ADM  Status of Patient: New Patient  Length of Appt: 60 minutes  Type of Appt: In Person   Patient need address: No

## 2025-02-19 ASSESSMENT — ENCOUNTER SYMPTOMS
BACK PAIN: 1
ABDOMINAL PAIN: 0
EYE PAIN: 0
ANAL BLEEDING: 0
SORE THROAT: 0
SHORTNESS OF BREATH: 0
COUGH: 0
BLOOD IN STOOL: 0

## 2025-03-18 NOTE — PROGRESS NOTES
Pharmacy Progress Note - Weight Management Initial      Assessment / Plan:   Weight Management:  Patient's current BMI is 41.14, which is considered morbid obesity [Class III Obestity].  Will have her start Zepbound 2.5mg weekly x4 weeks, then increase to 5mg weekly.  Goal will be to reach 15mg weekly based on pt tolerability.  Will reassess at follow up in 6 weeks.    The following medications will need dose adjustments/closer monitoring once Zepbound is started.  Discussed possible increase risk of hypoglycemic episodes. Instructed the patient to watch for signs/symptoms of low blood glucose.     Discussed lifestyle modifications, including diet and exercise.  Patient education provided.      Nutrition/Lifestyle Modifications:  - Educated pt on the importance of moderating carbohydrate intake. Reviewed sources of carbohydrates and method to help determine appropriate portion sizes.  - Recommend ~30 minutes consistent, moderately intensive, exercise/day or ~150 minutes/week. Start small, stay consistent, and increase length and types of exercise, as tolerated.      Patient will return to clinic in 6 week(s) for follow up.        Ms. Danisha Whaley, a 49 y.o. female referred by Vero Pickens MD,  has a past medical history of Anxiety, Arthritis, Depression, Gastric bypass status for obesity, Hypothyroidism, Morbid obesity, Morbid obesity with BMI of 40.0-44.9, adult, Nausea & vomiting, PCOS (polycystic ovarian syndrome), Psychiatric disorder, and Seasonal allergic rhinitis.  Pt was seen today for weight management.    Interim update: Pt was last seen by Vero Pickens MD on 2/13/25.  Per her note: Obesity, morbid (more than 100 lbs over ideal weight or BMI > 40: BMI is 41  - Ordering zepbound 2.5mg weekly. We discussed risks/benefits. All questions were answered.   - Referral placed to our clinical pharmacy team for education regarding how to inject this rx and with getting it covered by

## 2025-03-21 ENCOUNTER — PHARMACY VISIT (OUTPATIENT)
Facility: CLINIC | Age: 50
End: 2025-03-21

## 2025-03-21 DIAGNOSIS — E66.01 OBESITY, MORBID (MORE THAN 100 LBS OVER IDEAL WEIGHT OR BMI > 40): Primary | ICD-10-CM

## 2025-03-21 DIAGNOSIS — E66.01 OBESITY, CLASS III, BMI 40-49.9 (MORBID OBESITY): ICD-10-CM

## 2025-04-02 ENCOUNTER — TELEPHONE (OUTPATIENT)
Facility: CLINIC | Age: 50
End: 2025-04-02

## 2025-04-09 DIAGNOSIS — K27.9 PUD (PEPTIC ULCER DISEASE): ICD-10-CM

## 2025-04-09 RX ORDER — PANTOPRAZOLE SODIUM 40 MG/1
40 TABLET, DELAYED RELEASE ORAL
Qty: 90 TABLET | Refills: 2 | Status: SHIPPED | OUTPATIENT
Start: 2025-04-09

## 2025-04-21 DIAGNOSIS — E66.01 OBESITY, MORBID (MORE THAN 100 LBS OVER IDEAL WEIGHT OR BMI > 40) (HCC): ICD-10-CM

## 2025-04-21 DIAGNOSIS — E66.813 OBESITY, CLASS III, BMI 40-49.9 (MORBID OBESITY) (HCC): ICD-10-CM

## 2025-04-28 RX ORDER — LEVOTHYROXINE SODIUM 112 UG/1
112 TABLET ORAL
Qty: 90 TABLET | Refills: 1 | Status: SHIPPED | OUTPATIENT
Start: 2025-04-28

## 2025-04-30 NOTE — PROGRESS NOTES
Pharmacy Progress Note - Weight Management Initial      Assessment / Plan:   Weight Management:  Patient's current BMI is 39.31, which is considered obesity [Class II Obesity] which places her outside of the criteria for morbid obesity.  Will have her complete the last 3 injections of the 5mg dose, then increase to 7.5mg weekly x4, then increase to 10mg weekly.  Goal will be to reach 15mg weekly based on pt tolerability.  Will reassess at follow up in 8 weeks.     Nutrition/Lifestyle Modifications:  - Educated pt on the importance of moderating carbohydrate intake. Reviewed sources of carbohydrates and method to help determine appropriate portion sizes.  - Recommend ~30 minutes consistent, moderately intensive, exercise/day or ~150 minutes/week. Start small, stay consistent, and increase length and types of exercise, as tolerated.      Patient will return to clinic in 8 week(s) for follow up.        Ms. Danisha Whaley, a 49 y.o. female referred by Vero Pickens MD,  has a past medical history of Anxiety, Arthritis, Depression, Gastric bypass status for obesity, Hypothyroidism, Morbid obesity (HCC), Morbid obesity with BMI of 40.0-44.9, adult (HCC), Nausea & vomiting, PCOS (polycystic ovarian syndrome), Psychiatric disorder, and Seasonal allergic rhinitis.  Pt was seen today for weight management.    Interim update: Pt was last seen by me on 3/21/25.  Per that note: Patient's current BMI is 41.14, which is considered morbid obesity [Class III Obestity].  Will have her start Zepbound 2.5mg weekly x4 weeks, then increase to 5mg weekly.  Goal will be to reach 15mg weekly based on pt tolerability.  Will reassess at follow up in 6 weeks.      Today:  Pt will be taking her second dose of the 5mg Zepbound today.  She has not noticed any difference in appetite suppression.  She would like to move the dose to Friday because the end of the week of the Zepbound tx is tough to keep with her diet because her

## 2025-05-05 ENCOUNTER — PHARMACY VISIT (OUTPATIENT)
Facility: CLINIC | Age: 50
End: 2025-05-05

## 2025-05-05 VITALS — BODY MASS INDEX: 39.31 KG/M2 | WEIGHT: 236.2 LBS

## 2025-05-05 DIAGNOSIS — E66.9 OBESITY (BMI 30-39.9): Primary | ICD-10-CM

## 2025-05-07 DIAGNOSIS — E55.9 VITAMIN D DEFICIENCY: ICD-10-CM

## 2025-05-07 DIAGNOSIS — K90.9 INTESTINAL MALABSORPTION, UNSPECIFIED TYPE: ICD-10-CM

## 2025-05-07 DIAGNOSIS — K27.9 PUD (PEPTIC ULCER DISEASE): ICD-10-CM

## 2025-05-07 DIAGNOSIS — E61.1 IRON DEFICIENCY: Primary | ICD-10-CM

## 2025-05-07 DIAGNOSIS — D64.9 ANEMIA, UNSPECIFIED TYPE: ICD-10-CM

## 2025-05-08 ENCOUNTER — HOSPITAL ENCOUNTER (OUTPATIENT)
Facility: HOSPITAL | Age: 50
Setting detail: SPECIMEN
Discharge: HOME | End: 2025-05-11
Payer: COMMERCIAL

## 2025-05-08 DIAGNOSIS — E55.9 VITAMIN D DEFICIENCY: ICD-10-CM

## 2025-05-08 DIAGNOSIS — D64.9 ANEMIA, UNSPECIFIED TYPE: ICD-10-CM

## 2025-05-08 DIAGNOSIS — K27.9 PUD (PEPTIC ULCER DISEASE): ICD-10-CM

## 2025-05-08 DIAGNOSIS — K90.9 INTESTINAL MALABSORPTION, UNSPECIFIED TYPE: ICD-10-CM

## 2025-05-08 DIAGNOSIS — E61.1 IRON DEFICIENCY: ICD-10-CM

## 2025-05-08 LAB
25(OH)D3 SERPL-MCNC: 31.5 NG/ML (ref 30–100)
ALBUMIN SERPL-MCNC: 3.8 G/DL (ref 3.4–5)
ALBUMIN/GLOB SERPL: 1.3 (ref 0.8–1.7)
ALP SERPL-CCNC: 58 U/L (ref 45–117)
ALT SERPL-CCNC: 10 U/L (ref 10–35)
ANION GAP SERPL CALC-SCNC: 12 MMOL/L (ref 3–18)
AST SERPL-CCNC: 16 U/L (ref 10–38)
BASOPHILS # BLD: 0.04 K/UL (ref 0–0.1)
BASOPHILS NFR BLD: 0.6 % (ref 0–2)
BILIRUB SERPL-MCNC: 0.4 MG/DL (ref 0.2–1)
BUN SERPL-MCNC: 16 MG/DL (ref 6–23)
BUN/CREAT SERPL: 20 (ref 12–20)
CALCIUM SERPL-MCNC: 9.4 MG/DL (ref 8.5–10.1)
CHLORIDE SERPL-SCNC: 103 MMOL/L (ref 98–107)
CO2 SERPL-SCNC: 24 MMOL/L (ref 21–32)
CREAT SERPL-MCNC: 0.78 MG/DL (ref 0.6–1.3)
DIFFERENTIAL METHOD BLD: ABNORMAL
EOSINOPHIL # BLD: 0.05 K/UL (ref 0–0.4)
EOSINOPHIL NFR BLD: 0.8 % (ref 0–5)
ERYTHROCYTE [DISTWIDTH] IN BLOOD BY AUTOMATED COUNT: 25.5 % (ref 11.6–14.5)
FERRITIN SERPL-MCNC: 37 NG/ML (ref 13–400)
GLOBULIN SER CALC-MCNC: 2.9 G/DL (ref 2–4)
GLUCOSE SERPL-MCNC: 94 MG/DL (ref 74–108)
HCT VFR BLD AUTO: 43.5 % (ref 35–45)
HGB BLD-MCNC: 12.4 G/DL (ref 12–16)
IMM GRANULOCYTES # BLD AUTO: 0.02 K/UL (ref 0–0.04)
IMM GRANULOCYTES NFR BLD AUTO: 0.3 % (ref 0–0.5)
IRON SATN MFR SERPL: 15 %
IRON SERPL-MCNC: 49 UG/DL (ref 50–175)
LYMPHOCYTES # BLD: 1.39 K/UL (ref 0.9–3.6)
LYMPHOCYTES NFR BLD: 22.4 % (ref 21–52)
MCH RBC QN AUTO: 22.5 PG (ref 24–34)
MCHC RBC AUTO-ENTMCNC: 28.5 G/DL (ref 31–37)
MCV RBC AUTO: 78.8 FL (ref 78–100)
MONOCYTES # BLD: 0.41 K/UL (ref 0.05–1.2)
MONOCYTES NFR BLD: 6.6 % (ref 3–10)
NEUTS SEG # BLD: 4.29 K/UL (ref 1.8–8)
NEUTS SEG NFR BLD: 69.3 % (ref 40–73)
NRBC # BLD: 0 K/UL (ref 0–0.01)
NRBC BLD-RTO: 0 PER 100 WBC
PLATELET # BLD AUTO: 285 K/UL (ref 135–420)
POTASSIUM SERPL-SCNC: 4.1 MMOL/L (ref 3.5–5.5)
PROT SERPL-MCNC: 6.8 G/DL (ref 6.4–8.2)
RBC # BLD AUTO: 5.52 M/UL (ref 4.2–5.3)
SODIUM SERPL-SCNC: 139 MMOL/L (ref 136–145)
TIBC SERPL-MCNC: 329 UG/DL (ref 250–450)
UIBC SERPL-MCNC: 280 UG/DL (ref 112–347)
WBC # BLD AUTO: 6.2 K/UL (ref 4.6–13.2)

## 2025-05-08 PROCEDURE — 82306 VITAMIN D 25 HYDROXY: CPT

## 2025-05-08 PROCEDURE — 82728 ASSAY OF FERRITIN: CPT

## 2025-05-08 PROCEDURE — 80053 COMPREHEN METABOLIC PANEL: CPT

## 2025-05-08 PROCEDURE — 83550 IRON BINDING TEST: CPT

## 2025-05-08 PROCEDURE — 36415 COLL VENOUS BLD VENIPUNCTURE: CPT

## 2025-05-08 PROCEDURE — 83540 ASSAY OF IRON: CPT

## 2025-05-08 PROCEDURE — 85025 COMPLETE CBC W/AUTO DIFF WBC: CPT

## 2025-05-28 ENCOUNTER — PATIENT MESSAGE (OUTPATIENT)
Facility: CLINIC | Age: 50
End: 2025-05-28

## 2025-06-12 ENCOUNTER — PATIENT MESSAGE (OUTPATIENT)
Facility: CLINIC | Age: 50
End: 2025-06-12

## 2025-06-25 NOTE — PROGRESS NOTES
to literature.    Relevant Laboratory Values  Wt Readings from Last 10 Encounters:   25 100.2 kg (220 lb 12.8 oz)   25 107.1 kg (236 lb 3.2 oz)   25 112.1 kg (247 lb 3.2 oz)   01/15/25 114.3 kg (252 lb)   23 115.2 kg (254 lb)   23 117.9 kg (260 lb)   22 115.9 kg (255 lb 9.6 oz)   22 113.9 kg (251 lb)   09/10/21 114.8 kg (253 lb)   21 112.9 kg (249 lb)       Vitals at visit today:  Wt 100.2 kg (220 lb 12.8 oz)   BMI 36.74 kg/m²     Lab Results   Component Value Date    CHOL 211 (H) 2025    CHOL 237 (H) 2023    CHOL 249 (H) 2022     Lab Results   Component Value Date    TRIG 198 (H) 2025    TRIG 215 (H) 2023    TRIG 166 (H) 2022     Lab Results   Component Value Date    HDL 47 2025    HDL 47 2023    HDL 51 2022     No components found for: \"LDLCHOLESTEROL\", \"LDLCALC\"    Lab Results   Component Value Date    CHOL 211 (H) 2025    TRIG 198 (H) 2025    HDL 47 2025    .4 (H) 2025       Future Appointments   Date Time Provider Department Center   2025  8:00 AM Karel Huang, [id number]       Thank you for the consult,  Karel Huang, PharmD, BCACP, BC-ADM        For Pharmacy Admin Tracking Only    Program: Medical Group  CPA in place:  Yes  Recommendation Provided To: Patient/Caregiver: 5 via In person  Intervention Detail: Adherence Monitorin, Dose Adjustment: 1, reason: Therapy Optimization, New Rx: 2, reason: Needs Additional Therapy, and Scheduled Appointment  Intervention Accepted By: Patient/Caregiver: 5  Gap Closed?: Yes   Time Spent (min): 20

## 2025-06-30 ENCOUNTER — PHARMACY VISIT (OUTPATIENT)
Facility: CLINIC | Age: 50
End: 2025-06-30

## 2025-06-30 VITALS — WEIGHT: 220.8 LBS | BODY MASS INDEX: 36.74 KG/M2

## 2025-06-30 DIAGNOSIS — E66.9 OBESITY (BMI 30-39.9): Primary | ICD-10-CM

## 2025-07-10 ENCOUNTER — TELEPHONE (OUTPATIENT)
Facility: CLINIC | Age: 50
End: 2025-07-10

## 2025-07-14 ENCOUNTER — PATIENT MESSAGE (OUTPATIENT)
Facility: CLINIC | Age: 50
End: 2025-07-14

## 2025-07-14 DIAGNOSIS — E66.9 OBESITY (BMI 30-39.9): Primary | ICD-10-CM

## 2025-07-14 NOTE — TELEPHONE ENCOUNTER
Pharmacy Progress Note    Insurance no longer covers Zepbound.  Will switch to Wegovy at max dose given her plan was to increase Zepbound to 12.5mg.  Will advise pt it is a new injection device for medication delivery.    Thank you,    Karel Huang, PharmD, BCACP, [facility]      For Pharmacy Admin Tracking Only    Program: Medical Group  CPA in place:  Yes  Recommendation Provided To: Patient/Caregiver: 4 via ciValue Message  Intervention Detail: Adherence Monitorin, Discontinued Rx: 1, reason: Cost/Formulary Change, New Rx: 1, reason: Cost/Formulary Change, and Patient Access Assistance/Sample Provided  Intervention Accepted By: Patient/Caregiver: 4  Gap Closed?: Yes   Time Spent (min): 15

## 2025-07-18 ENCOUNTER — TELEPHONE (OUTPATIENT)
Facility: CLINIC | Age: 50
End: 2025-07-18

## 2025-08-25 ENCOUNTER — PHARMACY VISIT (OUTPATIENT)
Facility: CLINIC | Age: 50
End: 2025-08-25

## 2025-08-25 VITALS — BODY MASS INDEX: 35.71 KG/M2 | WEIGHT: 214.6 LBS

## 2025-08-25 DIAGNOSIS — E66.9 OBESITY (BMI 30-39.9): ICD-10-CM
